# Patient Record
Sex: MALE
[De-identification: names, ages, dates, MRNs, and addresses within clinical notes are randomized per-mention and may not be internally consistent; named-entity substitution may affect disease eponyms.]

---

## 2019-07-15 NOTE — XMS REPORT
[facility]

 Created on:2019



Patient:Shane Romero

Sex:Male

:1950

External Reference #:[id number]





Demographics







 Address  [address] [address]

 

 Phone  [phone]

 

 Preferred Language  en

 

 Marital Status  Unknown

 

 Mormon Affiliation  Unknown

 

 Race  Other Race

 

 Ethnic Group  Unknown









Author







 Organization  [employer]









Care Team Providers







 Name  Role  Phone

 

 Hermilo Colton  Provider Role  Unavailable









Allergies, Adverse Reactions, Alerts







 Substance  Reaction  Event Type

 

 Watermelon  Info Not Available  Non Drug Allergy

 

 Avacado  Info Not Available  Non Drug Allergy







Problems







 Problem Type  Condition  Code  Onset Dates  Condition Status

 

 Assessment  Dizziness and giddiness  R42    Active

 

 Assessment  Unspecified urinary incontinence  R32    Active

 

 Assessment  Long term current use of insulin  Z79.4    Active

 

 Problem  H/O Bell's palsy  Z86.69    Active

 

 Assessment  Anemia, chronic disease  D63.8    Active

 

 Problem  Chronic vertigo  R42    Active

 

 Assessment  Vitamin D deficiency  E55.9    Active

 

 Problem  Unspecified urinary incontinence  R32    Active

 

 Problem  Anemia, chronic disease  D63.8    Active

 

 Problem  Type 2 diabetes mellitus without  E11.9    Active



   complications      

 

 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



   incontinence      

 

 Problem  Urge incontinence of urine  N39.41    Active

 

 Assessment  Benign essential hypertension  I10    Active

 

 Assessment  Hyperlipidemia  E78.5    Active

 

 Problem  CPAP (continuous positive airway  Z99.89    Active



   pressure) dependence      

 

 Assessment  H/O Bell's palsy  Z86.69    Active

 

 Problem  Erectile dysfunction  N52.9    Active

 

 Problem  Allergic rhinitis, seasonal  J30.2    Active

 

 Problem  Long term current use of insulin  Z79.4    Active

 

 Problem  Benign essential hypertension  I10    Active

 

 Problem  JOES (obstructive sleep apnea)  G47.33    Active

 

 Assessment  JOSE (obstructive sleep apnea)  G47.33    Active

 

 Assessment  Type 2 diabetes mellitus without  E11.9    Active



   complications      

 

 Problem  Hyperlipidemia  E78.5    Active

 

 Problem  BMI 45.0-49.9, adult  Z68.42    Active

 

 Problem  Carotid artery stenosis and  I65.29    Active



   occlusion      

 

 Problem  Vitamin D deficiency  E55.9    Active







Medications







 Medication  Code  Code  Instructions  Start  End  Status  Dosage



   System      Date  Date    

 

 Cozaar  NDC  97610187092  100 MG Orally      Inactive  1 tablet



       Once a day        

 

 Bystolic  NDC  95800809695  20 MG Orally      Active  1 tablet



       Once a day        

 

 Cozaar  NDC  01575818231  100 MG Orally      Active  1 tablet



       Once a day        

 

 CPap titration  NDC  88171215600  5 (28)-10 (21)      Active  not



       MG Orally        defined

 

 HydrALAZINE HCl  NDC  78084391458  100 MG Orally      Active  1 tablet



       Three times a        with food



       day        

 

 Bystolic  ND  80650077249  20 MG      Active  TAKE 1



               TABLET ONE



               TIME DAILY

 

 Daily Multiple  NDC  0        Active  not



 Vitamin/Iron              defined

 

 HydrALAZINE HCl  NDC  22011744261  100 MG      Active  TAKE 1



               TABLET



               THREE



               TIMES



               DAILY WITH



               FOOD

 

 Olmesartan  NDC  53130889663  20 MG Orally      Active  1 tablet



 Medoxomil      Once a day        

 

 Metformin HCl  NDC  82443783130  500 MG Orally      Active  1 tablet



       Twice a day        with meals

 

 Touabigail CatesoStar  NDC  15724564822  300 UNIT/ML      Active  55 units



       Subcutaneous 50        



       units every        



       other night        

 

 Amlodipine  NDC  97651397805  5 MG Orally      Active  1 tablet



 Besylate      Once a day        

 

 Vitamin D3  NDC  25746161090  5000 UNIT      Active  1 capsule



 Maximum      Orally Once a        



 Strength      day        

 

 Lovastatin  NDC  37009623075  20 MG Orally      Active  1 tablet



       Once a day        with a



               meal

 

 Lovastatin  NDC  71008093368  20 MG      Active  TAKE 1



               TABLET



               WITH A



               MEAL ONE



               TIME DAILY

 

 Aspirin  NDC  59444428051  81 MG Orally      Active  1 tablet



       Once a day        

 

 Flonase Allergy  NDC  93019475575  50 MCG/ACT      Active  1 spray in



 Relief      Nasally Once a        each



       day        nostril

 

 Olmesartan  NDC  43121585042  20 MG Orally  ,    Active  1 tablet



 Medoxomil      Once a day        







Results

No Known Results



Summary Purpose

[employer]ClinicalWorks Submission

## 2019-07-15 NOTE — XMS REPORT
[facility]

 Created on:October 3, 2018



Patient:Shane Romero

Sex:Male

:1950

External Reference #:[id number]





Demographics







 Address  [address] [address]

 

 Phone  [phone]

 

 Preferred Language  en

 

 Marital Status  Unknown

 

 Worship Affiliation  Unknown

 

 Race  Other Race

 

 Ethnic Group  Unknown









Author







 Organization  [employer]









Care Team Providers







 Name  Role  Phone

 

 Colton Akhtar  Provider Role  Unavailable









Allergies

No Known Allergies



Problems







 Problem Type  Condition  Code  Onset Dates  Condition Status

 

 Assessment  Dizziness and giddiness  R42    Active

 

 Assessment  Unspecified urinary incontinence  R32    Active

 

 Problem  Anemia, chronic disease  D63.8    Active

 

 Assessment  Long term current use of insulin  Z79.4    Active

 

 Problem  Allergic rhinitis, seasonal  J30.2    Active

 

 Assessment  Anemia, chronic disease  D63.8    Active

 

 Problem  Erectile dysfunction  N52.9    Active

 

 Problem  Long term current use of insulin  Z79.4    Active

 

 Problem  Benign essential hypertension  I10    Active

 

 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



   incontinence      

 

 Problem  BMI 45.0-49.9, adult  Z68.42    Active

 

 Assessment  Hyperlipidemia  E78.5    Active

 

 Assessment  H/O Bell's palsy  Z86.69    Active

 

 Problem  Urge incontinence of urine  N39.41    Active

 

 Assessment  Vitamin D deficiency  E55.9    Active

 

 Problem  Carotid artery stenosis and  I65.29    Active



   occlusion      

 

 Problem  JOSE (obstructive sleep apnea)  G47.33    Active

 

 Problem  Hyperlipidemia  E78.5    Active

 

 Problem  Vitamin D deficiency  E55.9    Active

 

 Assessment  Type 2 diabetes mellitus without  E11.9    Active



   complications      

 

 Assessment  Benign essential hypertension  I10    Active

 

 Assessment  JOSE (obstructive sleep apnea)  G47.33    Active

 

 Problem  Unspecified urinary incontinence  R32    Active

 

 Problem  Type 2 diabetes mellitus without  E11.9    Active



   complications      

 

 Problem  H/O Bell's palsy  Z86.69    Active

 

 Problem  Chronic vertigo  R42    Active







Medications







 Medication  Code  Code  Instructions  Start  End  Status  Dosage



   System      Date  Date    

 

 HydrALAZINE HCl  NDC  15793514274  100 MG Orally      Active  1 tablet



       Three times a        with food



       day        

 

 Flonase Allergy  ND  72488713088  50 MCG/ACT      Active  1 spray in



 Relief      Nasally Once a        each



       day        nostril

 

 [place] SoloStar  NDC  03277763616  300 UNIT/ML      Active  55 units



       Subcutaneous 50        



       units every        



       other night        

 

 Lovastatin  NDC  23034210582  20 MG Orally      Active  1 tablet



       Once a day        with a



               meal

 

 CPap titration  NDC  04487594759  5 (28)-10 (21)      Active  not



       MG Orally        defined

 

 Aspirin  NDC  87089232885  81 MG Orally      Active  1 tablet



       Once a day        

 

 Vitamin D3  NDC  17635078914  5000 UNIT Orally      Active  1 capsule



 Maximum      Once a day        



 Strength              

 

 Metformin HCl  NDC  96459194304  500 MG Orally      Active  1 tablet



       Twice a day        with meals

 

 Bystolic  NDC  11459046602  20 MG Orally      Active  1 tablet



       Once a day        

 

 Lovastatin  NDC  92933877144  20 MG Orally      Active  1 tablet



       Once a day        with a



               meal

 

 Daily Multiple  NDC  0        Active  not



 Vitamin/Iron              defined

 

 Bystolic  NDC  81541599043  20 MG Orally      Active  1 tablet



       Once a day        

 

 HydrALAZINE HCl  NDC  89343260738  100 MG Orally      Active  1 tablet



       Three times a        with food



       day        

 

 Cozaar  NDC  67167816168  100 MG Orally      Active  1 tablet



       Once a day        

 

 Amlodipine  NDC  70299671635  5 MG Orally Once      Active  1 tablet



 Besylate      a day        

 

 Cozaar  NDC  96575958735  100 MG Orally      Active  1 tablet



       Once a day        







Results

No Known Results



Summary Purpose

[employer]Clinical[employer] Submission

## 2019-07-15 NOTE — P.BOP
Preoperative diagnosis: Nuclear sclerotic and cortical cataract OS


Postoperative diagnosis: Same


Primary procedure: Phacoemulsification with IOL OS


Estimated blood loss: None


Anesthesia: Local (Subtenon's infusion with anesthesia for cataract surgery)


Complications: None


Implants: ZCB00 +23.5


Transferred to: Other (Day surgery)


Condition: Good

## 2019-07-15 NOTE — XMS REPORT
[facility]

 Created on:2019



Patient:Shane Romero

Sex:Male

:1950

External Reference #:[id number]





Demographics







 Address  [address]



   [place] [address]

 

 Phone  [phone]

 

 Preferred Language  en

 

 Marital Status  Unknown

 

 Catholic Affiliation  Unknown

 

 Race  Other Race

 

 Ethnic Group  Unknown









Author







 Organization  [employer]









Care Team Providers







 Name  Role  Phone

 

 Hermilo Colton  Provider Role  Unavailable









Allergies

No Known Allergies



Problems







 Problem Type  Condition  Code  Onset Dates  Condition Status

 

 Problem  Unspecified urinary incontinence  R32    Active

 

 Problem  Anemia, chronic disease  D63.8    Active

 

 Problem  Type 2 diabetes mellitus without  E11.9    Active



   complications      

 

 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



   incontinence      

 

 Problem  Urge incontinence of urine  N39.41    Active

 

 Problem  CPAP (continuous positive airway  Z99.89    Active



   pressure) dependence      

 

 Problem  Erectile dysfunction  N52.9    Active

 

 Problem  Allergic rhinitis, seasonal  J30.2    Active

 

 Problem  Long term current use of insulin  Z79.4    Active

 

 Problem  Benign essential hypertension  I10    Active

 

 Problem  JOSE (obstructive sleep apnea)  G47.33    Active

 

 Problem  Hyperlipidemia  E78.5    Active

 

 Problem  BMI 45.0-49.9, adult  Z68.42    Active

 

 Problem  Carotid artery stenosis and  I65.29    Active



   occlusion      

 

 Problem  H/O Bell's palsy  Z86.69    Active

 

 Problem  Vitamin D deficiency  E55.9    Active

 

 Problem  Chronic vertigo  R42    Active







Medications

No Known Medications



Results

No Known Results



Summary Purpose

[employer]ClinicalWorks Submission

## 2019-07-15 NOTE — XMS REPORT
[facility]

 Created on:2018



Patient:Shane Romero

Sex:Male

:1950

External Reference #:[id number]





Demographics







 Address  [address]



   [place] [address]

 

 Phone  [phone]

 

 Preferred Language  en

 

 Marital Status  Unknown

 

 Alevism Affiliation  Unknown

 

 Race  Other Race

 

 Ethnic Group  Unknown









Author







 Organization  [employer]









Care Team Providers







 Name  Role  Phone

 

 Hermilo Colton  Provider Role  Unavailable









Allergies

No Known Allergies



Problems







 Problem Type  Condition  Code  Onset Dates  Condition Status

 

 Problem  Erectile dysfunction  N52.9    Active

 

 Problem  Long term current use of insulin  Z79.4    Active

 

 Problem  Benign essential hypertension  I10    Active

 

 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



   incontinence      

 

 Problem  BMI 45.0-49.9, adult  Z68.42    Active

 

 Problem  Urge incontinence of urine  N39.41    Active

 

 Problem  Carotid artery stenosis and  I65.29    Active



   occlusion      

 

 Problem  JOSE (obstructive sleep apnea)  G47.33    Active

 

 Problem  Hyperlipidemia  E78.5    Active

 

 Problem  Vitamin D deficiency  E55.9    Active

 

 Problem  Unspecified urinary incontinence  R32    Active

 

 Problem  Type 2 diabetes mellitus without  E11.9    Active



   complications      

 

 Problem  H/O Bell's palsy  Z86.69    Active

 

 Problem  Anemia, chronic disease  D63.8    Active

 

 Problem  Chronic vertigo  R42    Active

 

 Problem  Allergic rhinitis, seasonal  J30.2    Active







Medications

No Known Medications



Results

No Known Results



Summary Purpose

[employer]Clinical[employer] Submission

## 2019-07-15 NOTE — XMS REPORT
[facility]

 Created on:May 1, 2019



Patient:Shane Romero

Sex:Male

:1950

External Reference #:[id number]





Demographics







 Address  [address] [address]

 

 Phone  [phone]

 

 Preferred Language  en

 

 Marital Status  Unknown

 

 Hindu Affiliation  Unknown

 

 Race  Other Race

 

 Ethnic Group  Unknown









Author







 Organization  [employer]









Care Team Providers







 Name  Role  Phone

 

 Hermilo Colton  Provider Role  Unavailable









Allergies, Adverse Reactions, Alerts







 Substance  Reaction  Event Type

 

 Watermelon  Info Not Available  Non Drug Allergy

 

 Avacado  Info Not Available  Non Drug Allergy







Problems







 Problem Type  Condition  Code  Onset Dates  Condition Status

 

 Assessment  Dizziness and giddiness  R42    Active

 

 Assessment  Unspecified urinary incontinence  R32    Active

 

 Assessment  Long term current use of insulin  Z79.4    Active

 

 Problem  H/O Bell's palsy  Z86.69    Active

 

 Assessment  Anemia, chronic disease  D63.8    Active

 

 Problem  Chronic vertigo  R42    Active

 

 Assessment  Vitamin D deficiency  E55.9    Active

 

 Problem  Unspecified urinary incontinence  R32    Active

 

 Problem  Anemia, chronic disease  D63.8    Active

 

 Problem  Type 2 diabetes mellitus without  E11.9    Active



   complications      

 

 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



   incontinence      

 

 Problem  Urge incontinence of urine  N39.41    Active

 

 Assessment  Benign essential hypertension  I10    Active

 

 Assessment  Hyperlipidemia  E78.5    Active

 

 Problem  CPAP (continuous positive airway  Z99.89    Active



   pressure) dependence      

 

 Assessment  H/O Bell's palsy  Z86.69    Active

 

 Problem  Erectile dysfunction  N52.9    Active

 

 Problem  Allergic rhinitis, seasonal  J30.2    Active

 

 Problem  Long term current use of insulin  Z79.4    Active

 

 Problem  Benign essential hypertension  I10    Active

 

 Problem  JOSE (obstructive sleep apnea)  G47.33    Active

 

 Assessment  JOSE (obstructive sleep apnea)  G47.33    Active

 

 Assessment  Type 2 diabetes mellitus without  E11.9    Active



   complications      

 

 Problem  Hyperlipidemia  E78.5    Active

 

 Problem  BMI 45.0-49.9, adult  Z68.42    Active

 

 Problem  Carotid artery stenosis and  I65.29    Active



   occlusion      

 

 Problem  Vitamin D deficiency  E55.9    Active







Medications







 Medication  Code  Code  Instructions  Start  End  Status  Dosage



   System      Date  Date    

 

 Lovastatin  NDC  99165133970  20 MG Orally      Active  1 tablet



       Once a day        with a



               meal

 

 Bystolic  NDC  22808134354  20 MG Orally      Active  1 tablet



       Once a day        

 

 Aspirin  NDC  61638840277  81 MG Orally      Active  1 tablet



       Once a day        

 

 Metformin HCl  NDC  93335463034  500 MG Orally      Active  1 tablet



       Twice a day        with meals

 

 HydrALAZINE HCl  NDC  53065076716  100 MG Orally      Active  1 tablet



       Three times a        with food



       day        

 

 CPap titration  NDC  13532154485  5 (28)-10 (21)      Active  not



       MG Orally        defined

 

 Lovastatin  NDC  97582704221  20 MG      Active  TAKE 1



               TABLET



               WITH A



               MEAL ONE



               TIME DAILY

 

 Olmesartan  NDC  64006136806  20 MG Orally      Active  1 tablet



 Medoxomil      Once a day        

 

 Olmesartan  NDC  09646146819  20 MG Orally      Active  1 tablet



 Medoxomil      Once a day        

 

 Daily Multiple  NDC  0        Active  not



 Vitamin/Iron              defined

 

 Flonase Allergy  NDC  47365148760  50 MCG/ACT      Active  1 spray in



 Relief      Nasally Once a        each



       day        nostril

 

 Toujeo SoloStar  NDC  61856351237  300 UNIT/ML      Active  55 units



       Subcutaneous 50        



       units every        



       other night        

 

 HydrALAZINE HCl  NDC  10650303216  100 MG      Active  TAKE 1



               TABLET



               THREE



               TIMES



               DAILY WITH



               FOOD

 

 Amlodipine  NDC  74261882742  5 MG Orally Once      Active  1 tablet



 Besylate      a day        

 

 Bystolic  NDC  08623756251  20 MG      Active  TAKE 1



               TABLET



               EVERY DAY

 

 Vitamin D3  NDC  54287639584  5000 UNIT Orally      Active  1 capsule



 Maximum      Once a day        



 Strength              







Results

No Known Results



Summary Purpose

eClinicalWorks Submission

## 2019-07-15 NOTE — XMS REPORT
[facility]

 Created on:May 6, 2019



Patient:Shane Romero

Sex:Male

:1950

External Reference #:[id number]





Demographics







 Address  [address] [address]

 

 Phone  [phone]

 

 Preferred Language  en

 

 Marital Status  Unknown

 

 Moravian Affiliation  Unknown

 

 Race  Other Race

 

 Ethnic Group  Unknown









Author







 Organization  [employer]









Care Team Providers







 Name  Role  Phone

 

 Colton Akhtar  Provider Role  Unavailable









Allergies

No Known Allergies



Problems







 Problem Type  Condition  Code  Onset Dates  Condition Status

 

 Problem  Unspecified urinary incontinence  R32    Active

 

 Problem  Anemia, chronic disease  D63.8    Active

 

 Problem  Type 2 diabetes mellitus without  E11.9    Active



   complications      

 

 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



   incontinence      

 

 Assessment  Type 2 diabetes mellitus without  E11.9    Active



   complications      

 

 Problem  Urge incontinence of urine  N39.41    Active

 

 Problem  CPAP (continuous positive airway  Z99.89    Active



   pressure) dependence      

 

 Problem  Erectile dysfunction  N52.9    Active

 

 Problem  Allergic rhinitis, seasonal  J30.2    Active

 

 Problem  Long term current use of insulin  Z79.4    Active

 

 Problem  Benign essential hypertension  I10    Active

 

 Problem  JOSE (obstructive sleep apnea)  G47.33    Active

 

 Assessment  Hyperlipidemia  E78.5    Active

 

 Assessment  Benign essential hypertension  I10    Active

 

 Problem  Hyperlipidemia  E78.5    Active

 

 Problem  BMI 45.0-49.9, adult  Z68.42    Active

 

 Problem  Carotid artery stenosis and  I65.29    Active



   occlusion      

 

 Problem  H/O Bell's palsy  Z86.69    Active

 

 Problem  Vitamin D deficiency  E55.9    Active

 

 Problem  Chronic vertigo  R42    Active







Medications







 Medication  Code  Code  Instructions  Start  End Date  Status  Dosage



   System      Date      

 

 Bystolic  NDC  32886600967  20 MG Orally      Active  1 tablet



       Once a day        

 

 Olmesartan  NDC  33214889192  20 MG Orally      Active  1 tablet



 Medoxomil      Once a day        

 

 Lovastatin  NDC  19554810182  20 MG Orally      Active  1 tablet



       Once a day        with a



               meal

 

 Metformin HCl  NDC  07680078748  500 MG Orally      Active  1 tablet



       Twice a day        with



               meals

 

 Amlodipine  NDC  62180763788  5 MG Orally Once      Active  1 tablet



 Besylate      a day        







Results

No Known Results



Summary Purpose

eClinicalWorks Submission

## 2019-07-16 NOTE — OP
Date of Procedure:  07/15/2019



Surgeon:  Yoko Marcano MD



Anesthesiologist:  Stephan Collins CRNA and Alex Banegas MD.



Preoperative Diagnosis:  Nuclear sclerotic and cortical cataract, left eye.



Operation Performed:  Phacoemulsification with intraocular lens implant, left eye.



Anesthesia:  Per cataract surgery.



Complications:  None.



Description Of Procedure:  In day surgery, the patient was prepped with Betadine and draped.  A conju
nctival incision was made in the inferior nasal quadrant with Vinod scissors.  A sub-Tenon block c
onsisting of a 1:1 mixture of 2% Xylocaine and 0.25% bupivacaine was placed through the conjunctival 
incision with a blunt cannula.  A Honan balloon was placed over the eye and the patient was transferr
ed to the operating room. 



In the operating room the patient was prepped and draped in the usual sterile fashion for ophthalmic 
surgery.  A lid speculum was placed in the left eye.  Two paracentesis sites were made superiorly and
 inferiorly in the limbal cornea.  Viscoat was placed in the anterior chamber and a crescent blade wa
s used to make a corneal groove and tunnel, and a keratome was used to enter the anterior chamber.  P
rovisc was placed in the anterior chamber and a 360 degree capsulotomy was performed with a cystitome
.  The lens was hydrodissected with BSS and rotated freely.  The lens was removed with a stop and cho
p technique.  A 5.30 phaco CDE was used to remove the lens.  Residual cortex was removed with the irr
igation and aspiration.  Provisc was placed in the capsular bag.  A ZCB00 +23.5 lens was placed in th
e capsular bag without complications.  Irrigation and aspiration were used to remove residual viscoel
astic.  The paracentesis sites were hydrated with BSS.  The wound and paracentesis sites were inspect
ed and found to be watertight.  Vigamox 0.07 cc was placed intracamerally at the end of the procedure
.  The eye was irrigated with balanced salt solution.  The eye was patched with a soft cotton patch a
nd Milner metal shield. 



The patient was returned to day surgery in good condition.



Comments:  A 1:5000 epinephrine was placed in the anterior chamber prior to Viscoat and the lens was 
hydrodelineated rather than hydrodissected.



Discharge Instructions:  Mr. Romero is discharged to home in good condition and is to follow up with Dr Radha Marcano in the morning.





[id number]/JULIET

DD:  07/15/2019 14:20:50Voice ID:  [id number]

DT:  07/16/2019 00:40:45Report ID:  [id number]

## 2020-10-21 ENCOUNTER — HOSPITAL ENCOUNTER (EMERGENCY)
Dept: HOSPITAL 97 - ER | Age: 70
LOS: 1 days | Discharge: ANOTHER HOSPITAL | End: 2020-10-22
Payer: COMMERCIAL

## 2020-10-21 DIAGNOSIS — S72.331A: Primary | ICD-10-CM

## 2020-10-21 DIAGNOSIS — Z20.828: ICD-10-CM

## 2020-10-21 DIAGNOSIS — I10: ICD-10-CM
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
## 2020-10-21 DIAGNOSIS — E11.9: ICD-10-CM
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
## 2020-10-21 PROCEDURE — 73562 X-RAY EXAM OF KNEE 3: CPT

## 2020-10-21 PROCEDURE — 99285 EMERGENCY DEPT VISIT HI MDM: CPT

## 2020-10-21 PROCEDURE — 29505 APPLICATION LONG LEG SPLINT: CPT

## 2020-10-21 NOTE — XMS REPORT
Summary of Care

                          Created on:2020



Patient:Raul Romero

Sex:Male

:1950

External Reference #:[id number]





Demographics







                          Address                   [address]



                                                    [place] [address]

 

                          Phone                     [phone]

 

                          Home Phone                [phone]

 

                          Mobile Phone              [phone]

 

                          Email Address             [email or web address]

 

                          Preferred Language        English

 

                          Marital Status            

 

                          Holiness Affiliation     Unknown

 

                          Race                      White

 

                          Ethnic Group               or 









Author







                          Organization              [facility]

 

                          Address                   [address] [place] [address]









Support







                Name            Relationship    Address         Phone

 

                Hallie Romero       Unavailable     [address]    [phone]



                                                [place] [address] 

 

                Cookie Romero    Unavailable     Unavailable     [phone]









Care Team Providers







                    Name                Role                Phone

 

                    Colton Akhtar     Primary Care Provider [phone]









Reason for Visit







                          Reason                    Comments

 

                          Follow-up                 3mo







Encounter Details







             Date         Type         Department   Care Team    Description

 

                2020      Office Visit    [facility]     Halle Menjivar M D



                                        Essential hypertension (Primary Dx);



                                                            Cardiology- [address]         Dizziness;



                                                [address] [place] [address] DRIVE



                                        Morbid obesity;



                                                            Drive, [address]



                                        [address]



                                        JOSE on CPAP



                                                [place], 

[address]



                                        



                                                            [phone] (Fax) 







Allergies

No Known Allergiesdocumented as of this encounter (statuses as of 2020)



Medications







          Medication Sig       Dispensed Refills   Start Date End Date  Status

 

          metFORMIN 500 mg 24 Take 500 mg           0                           

  Active



          hr tablet by mouth 2                                         



                    (two) times                                         



                    daily with                                         



                    meals.                                            

 

          CALCIUM   Take 2,000           0                             Active



          CARBONATE/VITAMIN D3 Int'l Units                                      

   



          (VITAMIN D-3 ORAL) by mouth                                          



                    daily.                                            

 

          aspirin 81 mg Take 81 mg           0                             Activ

e



          chewable tablet by mouth                                          



                    daily.                                            

 

          insulin glargine inject 50           0                             Act

michelle



          (TOUJEO SOLOSTAR) Units under                                         



          300 unit/mL (1.5 mL) the skin                                         

 



          InPn      every other                                         



                    day.                                              

 

          MULTIVITAMIN Take  by            0                             Active



          ORALIndications: D 3 mouth                                            

 



          125 mcg one a day daily.                                            



                    Indications                                         



                    : D 3 125                                         



                    mcg one a                                         



                    day                                               

 

          carvediloL 6.25 mg Take 6.25           0                             A

ctive



          tablet    mg by mouth                                         



                    2 (two)                                           



                    times                                             



                    daily.                                            

 

          atorvastatin 40 mg Take 1    30 tablet 2         2020           

Active



          tabletIndications: tablet by                                         



          Cerebrovascular mouth at                                          



          accident (CVA), bedtime.                                          



          unspecified                                                   



          mechanism                                                   

 

          olmesartan 40 mg Take 40 mg           0                             Ac

tive



          tablet    by mouth                                          



                    daily.                                            

 

          docusate sodium Take  by            0                             Acti

ve



          (STOOL SOFTENER mouth                                             



          ORAL)     daily.                                            

 

          hydrALAZINE 100 mg Take 1              0         2020           

Active



          tablet    tablet by                                         



                    mouth every                                         



                    8 (eight)                                         



                    hours.                                            

 

          amLODIPine 10 mg Take 1    30 tablet 3         2020           Ac

tive



          tabletIndications: tablet by                                         



          Essential mouth                                             



          hypertension daily.                                            

 

          CALCIUM CARBONATE Take 1,200           0                    D

iscontinued



          ORAL      mg by mouth                               0         (Duplica

te)



                    daily.                                            



documented as of this encounter (statuses as of 2020)



Active Problems







                          Problem                   Noted Date

 

                           E46 Unspecified severe protein-calorie malnutrition 0

2020

 

                          Stroke                    2020

 

                          Dizziness                 2020



documented as of this encounter (statuses as of 2020)



Social History







             Tobacco Use  Types        Packs/Day    Years Used   Date

 

             Never Smoker                                        









                Smokeless Tobacco: Never Used                                 









                Alcohol Use     Drinks/Week     oz/Week         Comments

 

                No                                              









                          Sex Assigned at Birth     Date Recorded

 

                          Not on file               









                    COVID-19 Exposure   Response            Date Recorded

 

                    In the last month, have you been in contact with No / Unsure

         2020  2:39 PM 

CDT



                    someone who was confirmed or suspected to have              

       



                    Coronavirus / COVID-19?                     



documented as of this encounter



Last Filed Vital Signs







                Vital Sign      Reading         Time Taken      Comments

 

                Blood Pressure  150/79          2020  2:47 PM 



                                                CDT             

 

                Pulse           66              2020  2:47 PM 



                                                CDT             

 

                Temperature     -               -               

 

                Respiratory Rate 19              2020  2:44 PM 



                                                CDT             

 

                Oxygen Saturation 94%             2020  2:44 PM 



                                                CDT             

 

                Inhaled Oxygen Concentration -               -               

 

                Weight          137.6 kg (303 lb 6.4 oz) 2020  2:44 PM 



                                                CDT             

 

                Height          167.6 cm (5' 6") 2020  2:44 PM 



                                                CDT             

 

                Body Mass Index 48.97           2020  2:44 PM 



                                                CDT             



documented in this encounter



Patient Instructions

Patient InstructionsHalle Menjivar MD - 2020  2:20 PM CDTAmlodipine, 
carvedilol, olmesartan, hydralazine--try to hold one medicine for a week at a 
time trying to find out which one is causing dizzinessElectronically signed by 
Halle Menjivar MD at 2020  3:01 PM CDT

documented in this encounter



Progress Notes

Halle Menjivar MD - 2020  2:20 PM CDTFormatting of this note might be 
different from the original.

CARDIOLOGY CLINIC NOTE

2020

Reason for Referral/Presenting Complaint: HTN, dizziness



PCP: Colton Akhtar



History of Present Illness:

Raul Romero is a 69 years old male with history of DM, HTN, HLD, obesity, JOSE on
C-PAP and moderate carotid artery disease (50%). He was here to evaluate 
dizziness and falls. We have reduced antihypertensives. His dizziness has 
continued. It occurs with any position. He has been through extensive cardiac 
evaluation including ECHO/Holter/Catotid duplex etc.

He saw Dr. Martel for a 2nd opinion.

In 2020 he was admitted to [facility] for stroke. Presented with double vision. ECHO
showed an interatrial shunt. Home BP now 140-150s. His main complaint is still 
dizziness without vertigo.



ECHO 2020

Grade II diastolic dysfunction with elevated LA pressure.

There is concentric remodeling.

Left ventricular systolic function is normal.

Ejection Fraction = 60-65%.

Grade II diastolic dysfunction with elevated LA pressure.

The left ventricular wall motion is normal.

Injection of agitated saline contrast documented a significant interatrial 
shunt.



Review of Systems:

General: (-) fever, (-) chills, (-) weight change, (+) dizziness, (-) fatigue

Skin: (-) rash

HEENT: (-) headache, (-) change in vision

Neck: (-) difficulty swallowing

Heme: negative

Resp: (-) cough, (-) dyspnea on exertion

Cardio: (-) chest pain, (-) palpitations, (-) syncope

GI: (-) vomiting, (-) diarrhea

: negative

Endo: (-) diabetes, (-) thyroid disease

Neuro: (-) numbness, (-) tingling, (-) weakness

Back: (-) pain

HANNAH: (-) muscle pain, (-) claudication

Psych: (-) anxiety, (-) depression



Past Medical History:

Past Medical History:

Diagnosis Date

 Bell palsy

 DM (diabetes mellitus)

 HTN (hypertension)



Current Medications:

Current Outpatient Medications

Medication Sig Dispense Refill

 amLODIPine 10 mg tablet Take 1 tablet by mouth daily. 30 tablet 3

 hydrALAZINE 100 mg tablet Take 1 tablet by mouth every 8 (eight) hours.

 docusate sodium (STOOL SOFTENER ORAL) Take  by mouth daily.

 olmesartan 40 mg tablet Take 40 mg by mouth daily.

 carvediloL 6.25 mg tablet Take 6.25 mg by mouth 2 (two) times daily.

 aspirin 81 mg chewable tablet Take 81 mg by mouth daily.

 CALCIUM CARBONATE/VITAMIN D3 (VITAMIN D-3 ORAL) Take 2,000 Int'l Units by 
mouth daily.

 insulin glargine (TOUJEO SOLOSTAR) 300 unit/mL (1.5 mL) InPn inject 50 Units
under the skin every other day.

 metFORMIN 500 mg 24 hr tablet Take 500 mg by mouth 2 (two) times daily with 
meals.

 MULTIVITAMIN ORAL Take  by mouth daily. Indications: D 3 125 mcg one a day

 atorvastatin 40 mg tablet Take 1 tablet by mouth at bedtime. 30 tablet 2



No current facility-administered medications for this visit.



Social History:

Social History



Socioeconomic History

 Marital status: 

  Spouse name: Not on file

 Number of children: Not on file

 Years of education: Not on file

 Highest education level: Not on file

Occupational History

 Not on file

Social Needs

 Financial resource strain: Not on file

 Food insecurity

  Worry: Not on file

  Inability: Not on file

 Transportation needs

  Medical: Not on file

  Non-medical: Not on file

Tobacco Use

 Smoking status: Never Smoker

 Smokeless tobacco: Never Used

Substance and Sexual Activity

 Alcohol use: No

 Drug use: No

 Sexual activity: Not on file

Lifestyle

 Physical activity

  Days per week: Not on file

  Minutes per session: Not on file

 Stress: Not on file

Relationships

 Social connections

  Talks on phone: Not on file

  Gets together: Not on file

  Attends [employer] service: Not on file

  Active member of club or organization: Not on file

  Attends meetings of clubs or organizations: Not on file

  Relationship status: Not on file

 Intimate partner violence

  Fear of current or ex partner: Not on file

  Emotionally abused: Not on file

  Physically abused: Not on file

  Forced sexual activity: Not on file

Other Topics Concern

 Not on file

Social History Narrative

 Not on file



Family History

Family History

Problem Relation Age of Onset

 MI (myocardial infarction) Father 60



Physical Examination:

BP (!) 150/79  | Pulse 66  | Resp 19  | Ht 5' 6" (1.676 m)  | Wt 303 lb 6.4 oz 
(137.6 kg)  | SpO2 94%  | BMI 48.97 kg/m

Constitutional: alert and oriented x 3 (person, place and date/time); no 
apparent distress, obese

ENT: normocephalic atraumatic, supple, no lymphadenopathy, no bruits, no JVD

Lungs: clear to auscultation bilaterally

Cardiovascular: S1, S2 normal, regular; no murmurs, rubs or gallops

GI: soft; non-tender; + distended; normoactive bowel sounds

: not examined

Musculoskeletal: Extremities: no clubbing, cyanosis, + trace pitting edema

Skin: no rashes

Neuro: no focal deficits



Cardiovascular testing:

EKG:

Sinus bradycardia, HR 54 bpm.



Assessment/Plan:

  ICD-10-CM ICD-9-CM

1. Essential hypertension  I10 401.9

2. Dizziness  R42 780.4

3. Morbid obesity  E66.01 278.01

4. JOSE on CPAP  G47.33 327.23

 Z99.89 V46.8



HTN--His BP is mildly elevated. However dizziness is a barrier. Unclear if BP 
medications are causing dizziness. Advised him to hold on 1 BP pill for a week 
and find out if medications are the problem.Advised to increase amlodipine to 10
mg. Continue current antihypertensives--hydralazine, olmesartan, and coreg.

HLD/Carotid disease--On ASA and statins. With DM and carotid disease, consider 
goal LDL of 70 and moderate to high intensity statins.

Stroke--on ASA/plavix. Sees Neurology in [place].

Interatrial shunt--doubt the correlation with his stroke.

Patient was counseled for lifestyle modifications including: diet, exercise and 
weight loss. RTC 1 month



Halle Menjivar MD, FACC, NALINI

, Division of Cardiology

[facility]



Electronically signed by Halle Menjivar MD at 2020  9:04 PM CDTdocumented 
in this encounter



Plan of Treatment







             Date         Type         Specialty    Care Team    Description

 

                10/28/2020      Office Visit    Cardiology      Halle Menjivar M D



                                        



                                                                [address] [address]

15



                                        



                                                                [phone] (Fax) 









                Health Maintenance Due Date        Last Done       Comments

 

                HEPATITIS C (HCV) SCREEN 1950                      

 

                DTaP,Tdap,and Td Vaccines (1 - Tdap) 10/03/1969                 

     

 

                COLON CANCER SCREENING ANNUAL FIT/FOBT 10/03/2000               

       

 

                COLON CANCER SCREENING FIT DNA EVERY 3 YEARS 10/03/2000         

             

 

                COLON CANCER SCREENING SIGMOIDOSCOPY EVERY 5 YEARS 10/03/2000   

                   

 

                COLONOSCOPY     10/03/2000                      

 

                Colorectal Cancer Screening 10/03/2000                      

 

                Zoster Recombinant Vaccine (SHINGRIX) (1 of 2) 10/03/2000       

               

 

                Medicare Wellness Visit 10/03/2015                      

 

                PNEUMOCOCCAL VACCINES 65+ (1 of 1 - PPSV23) 10/03/2015          

            

 

                INFLUENZA VACCINE (#1) 2020                      

 

                Depression Screening 2021      



documented as of this encounter



Results

Not on filedocumented in this encounter



Visit Diagnoses







                                        Diagnosis

 

                                        Essential hypertension - Primary







                                        Unspecified essential hypertension

 

                                        Dizziness







                                        Dizziness and giddiness

 

                                        Morbid obesity

 

                                        JOSE on CPAP







                                        Obstructive sleep apnea (adult) (pediatr

ic)



documented in this encounter



Insurance







          Payer     Benefit Plan / Subscriber ID Effective Phone     Address   T

ype



                    Group               Dates                         

 

          MEDICARE  MEDICARE PART A [id number] 10/1/2015-Pre [phone]  [address]

X Medicare



                      & B                   sent       [id number]       [id number]



                                        



                                                            [place]        



                                                            [id number] 

 

          [employer]INENTAL [employer]INENTAL [id number] 10/1/2015-Pre                     I

ndemnity



          LIFE      LIFE                sent                          









           Guarantor Name Account Type Relation to Date of Birth Phone      Bill

ing



                                 Patient                          Address

 

           Raul Romero Personal/Family Self       1950 [phone] [address]







                                                       (Home)     [place], [place]



                                                                  [address]



documented as of this encounter

## 2020-10-21 NOTE — XMS REPORT
[facility]

                            Created on:2020



Patient:Shane Romero

Sex:Male

:1950

External Reference #:[id number]





Demographics







                          Address                   [address] [address]

 

                          Phone                     [phone]

 

                          Preferred Language        en

 

                          Marital Status            Unknown

 

                          [employer] Affiliation     Unknown

 

                          Race                      Unknown

 

                          Ethnic Group              Unknown









Author







                          Organization              [employer]









Care Team Providers







                    Name                Role                Phone

 

                    Hermilo Colton       Provider Role       Unavailable









Allergies, Adverse Reactions, Alerts







                    Substance           Reaction            Event Type

 

                    Watermelon          Info Not Available  Non Drug Allergy

 

                    [place]             Info Not Available  Non Drug Allergy







Problems







             Problem Type Condition    Code         Onset Dates  Condition Statu

s

 

             Assessment   Long term current use of insulin Z79.4                

     Active

 

             Assessment   Unspecified urinary incontinence R32                  

     Active

 

             Assessment   Vitamin D deficiency E55.9                     Active

 

             Assessment   Anemia, chronic disease D63.8                     Acti

ve

 

             Assessment   H/O Bell's palsy Z86.69                    Active

 

             Assessment   Hyperlipidemia E78.5                     Active

 

             Assessment   Benign essential hypertension I10                     

  Active

 

             Assessment   Type 2 diabetes mellitus without E11.9                

     Active



                          complications                           

 

             Assessment   JOSE (obstructive sleep apnea) G47.33                  

  Active

 

             Problem      Type 2 diabetes mellitus without E11.9                

     Active



                          complications                           

 

             Assessment   Right upper lobe pulmonary nodule R91.1               

      Active

 

             Problem      Anemia, chronic disease D63.8                     Acti

ve

 

             Assessment   Diplopia     H53.2                     Active

 

             Problem      Allergic rhinitis, seasonal J30.2                     

Active

 

             Problem      Benign essential hypertension I10                     

  Active

 

             Problem      Erectile dysfunction N52.9                     Active

 

             Problem      Right upper lobe pulmonary nodule R91.1               

      Active

 

             Problem      Multiple lacunar infarcts I63.81                    Ac

tive

 

             Assessment   Cerebral infarction due to I63.219                   A

ctive



                          unspecified occlusion or stenosis                     

      



                          of unspecified vertebral artery                       

    

 

             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

ctive



                          unspecified occlusion or stenosis                     

      



                          of unspecified vertebral artery                       

    

 

             Assessment   Multiple lacunar infarcts I63.81                    Ac

tive

 

             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                  

  Active



                          incontinence                           

 

             Problem      Long term current use of insulin Z79.4                

     Active

 

             Problem      CPAP (continuous positive airway Z99.89               

     Active



                          pressure) dependence                           

 

             Problem      Urge incontinence of urine N39.41                    A

ctive

 

             Assessment   Right leg weakness R29.898                   Active

 

             Problem      Vitamin D deficiency E55.9                     Active

 

             Assessment   Dizziness and giddiness R42                       Acti

ve

 

             Problem      Hyperlipidemia E78.5                     Active

 

             Problem      JOSE (obstructive sleep apnea) G47.33                  

  Active

 

             Assessment   History of falling Z91.81                    Active

 

             Problem      Carotid artery stenosis and I65.29                    

Active



                          occlusion                              

 

             Problem      Chronic vertigo R42                       Active

 

             Problem      Unspecified urinary incontinence R32                  

     Active

 

             Problem      BMI 45.0-49.9, adult Z68.42                    Active

 

             Problem      H/O Bell's palsy Z86.69                    Active







Medications







        Medication Code    Code    Instructions Start   End     Status  Dosage



                System                  Date    Date            

 

        Flonase Allergy NDC     67674432234 50 MCG/ACT                 Active  1

 spray in



        Relief                  Nasally Once a                         each



                                day                             nostril

 

        Metformin HCl NDC     11407856980 500 MG Orally                 Active  

1 tablet



                                Twice a day                         with meals

 

        CPap titration NDC     23491091826 5 (28)-10 ()                 Active

  not



                                MG Orally                         defined

 

        HydrALAZINE HCl NDC     70398410662 100 MG Orally                 Active

  1 tablet



                                Three times a                         with food



                                day                             

 

        Daily Multiple NDC     0                               Active  not



        Vitamin/Iron                                                 defined

 

        Toujeo SoloStar NDC     82777429837 300 UNIT/ML                 Active  

55 units



                                Subcutaneous 50                         



                                units every                         



                                other night                         

 

        Atorvastatin NDC     47497984435 40 MG Orally                 Active  1 

tablet



        Calcium                 Once a day                         

 

        Olmesartan NDC     22087794900 40 MG Orally                 Active  1 ta

blet



        Medoxomil                 Once a day                         

 

        Carvedilol NDC     40872504847 6.25 MG Orally                 Active  1 

tablet



                                Twice a day                         with food

 

        Amlodipine NDC     48650164686 5 MG Orally                 Active  1 tab

let



        Besylate                 Once a day                         

 

        Aspirin NDC     10895077242 81 MG Orally                 Active  1 table

t



                                Once a day                         

 

        Clopidogrel NDC     55541852300 75 MG Orally                 Active  1 t

ablet



        Bisulfate                 Once a day                         

 

        Vitamin D3 NDC     41577374232 5000 UNIT                 Active  1 capsu

le



        Maximum                 Orally Once a                         



        Strength                 day                             

 

        Lovastatin NDC     98451309988 20 MG Orally                 Inactive 1 t

ablet



                                Once a day                         with a



                                                                meal







Results

No Known Results



Summary Purpose

eClinicalWorks Submission

## 2020-10-21 NOTE — XMS REPORT
Summary of Care

                           Created on:2020



Patient:Raul Romero

Sex:Male

:1950

External Reference #:[id number]





Demographics







                          Address                   [address]



                                                    [place] [address]

 

                          Phone                     [phone]

 

                          Home Phone                [phone]

 

                          Mobile Phone              [phone]

 

                          Email Address             [email or web address]

 

                          Preferred Language        English

 

                          Marital Status            

 

                          [employer] Affiliation     Unknown

 

                          Race                      White

 

                          Ethnic Group               or 









Author







                          Organization              [facility]

 

                          Address                   [address] [place] [address]









Support







                Name            Relationship    Address         Phone

 

                Hallie Romero       Unavailable     [address]    [phone]



                                                [place] [address] 

 

                Cookie Romero    Unavailable     Unavailable     [phone]









Care Team Providers







                    Name                Role                Phone

 

                    Colton Akhtar     Primary Care Provider [phone]









Reason for Visit







                          Reason                    Comments

 

                          Refill Request            







Encounter Details







             Date         Type         Department   Care Team    Description

 

                2020      Refill          [facility]- Catrachita Menjivar MD



                                        Refill Request



                                                            [address]



                                        



                                                [address] [facility], [address]



                                        



                                                            [address]



                                        [place] [address]



                                        



                                                            [place] [address]

[address]



                                        [phone] (Fax) 







Allergies

No Known Allergiesdocumented as of this encounter (statuses as of 10/02/2020)



Medications







          Medication Sig       Dispensed Refills   Start Date End Date  Status

 

          metFORMIN 500 mg 24 Take 500 mg           0                           

  Active



          hr tablet by mouth 2                                         



                    (two) times                                         



                    daily with                                         



                    meals.                                            

 

          CALCIUM   Take 2,000           0                             Active



          CARBONATE/VITAMIN D3 Int'l Units                                      

   



          (VITAMIN D-3 ORAL) by mouth                                          



                    daily.                                            

 

          aspirin 81 mg Take 81 mg           0                             Activ

e



          chewable tablet by mouth                                          



                    daily.                                            

 

          insulin glargine inject 50           0                             Act

michelle



          (TOUJEO SOLOSTAR) 300 Units under                                     

    



          unit/mL (1.5 mL) InPn the skin                                        

  



                    every other                                         



                    day.                                              

 

          MULTIVITAMIN Take  by            0                             Active



          ORALIndications: D 3 mouth daily.                                     

    



          125 mcg one a day Indications:                                        

 



                    D 3 125 mcg                                         



                    one a day                                         

 

          carvediloL 6.25 mg Take 6.25 mg           0                           

  Active



          tablet    by mouth 2                                         



                    (two) times                                         



                    daily.                                            

 

          atorvastatin 40 mg Take 1    30 tablet 2         2020           

Active



          tabletIndications: tablet by                                         



          Cerebrovascular mouth at                                          



          accident (CVA), bedtime.                                          



          unspecified mechanism                                                 

  

 

          olmesartan 40 mg Take 40 mg           0                             Ac

tive



          tablet    by mouth                                          



                    daily.                                            

 

          docusate sodium Take  by            0                             Acti

ve



          (STOOL SOFTENER ORAL) mouth daily.                                    

     

 

          hydrALAZINE 100 mg Take 1              0         2020           

Active



          tablet    tablet by                                         



                    mouth every                                         



                    8 (eight)                                         



                    hours.                                            

 

          AMLODIPINE 10 mg TAKE 1    270 tablet 1         10/02/2020           A

ctive



          tabletIndications: TABLET BY                                         



          Essential MOUTH EVERY                                         



          hypertension DAY                                               

 

          amLODIPine 10 mg Take 1    30 tablet 3         2020 10/02/202 Di

scontinued



          tabletIndications: tablet by                               0         



          Essential mouth daily.                                         



          hypertension                                                   



documented as of this encounter (statuses as of 10/02/2020)



Active Problems







                          Problem                   Noted Date

 

                           E46 Unspecified severe protein-calorie malnutrition 0

2020

 

                          Stroke                    2020

 

                          Dizziness                 2020



documented as of this encounter (statuses as of 10/02/2020)



Social History







             Tobacco Use  Types        Packs/Day    Years Used   Date

 

             Never Smoker                                        









                Smokeless Tobacco: Never Used                                 









                Alcohol Use     Drinks/Week     oz/Week         Comments

 

                No                                              









                          Sex Assigned at Birth     Date Recorded

 

                          Not on file               









                    COVID-19 Exposure   Response            Date Recorded

 

                    In the last month, have you been in contact with No / Unsure

         2020  2:39 PM 

CDT



                    someone who was confirmed or suspected to have              

       



                    Coronavirus / COVID-19?                     



documented as of this encounter



Last Filed Vital Signs

Not on filedocumented in this encounter



Plan of Treatment







             Date         Type         Specialty    Care Team    Description

 

                10/28/2020      Office Visit    Cardiology      Halle Menjivar M D



                                        



                                                                [address] [place] [address]

15



                                        



                                                                [phone] ([phone] 









                Health Maintenance Due Date        Last Done       Comments

 

                HEPATITIS C (HCV) SCREEN 1950                      

 

                DTaP,Tdap,and Td Vaccines (1 - Tdap) 10/03/1969                 

     

 

                COLON CANCER SCREENING ANNUAL FIT/FOBT 10/03/2000               

       

 

                COLON CANCER SCREENING FIT DNA EVERY 3 YEARS 10/03/2000         

             

 

                COLON CANCER SCREENING SIGMOIDOSCOPY EVERY 5 YEARS 10/03/2000   

                   

 

                COLONOSCOPY     10/03/2000                      

 

                Colorectal Cancer Screening 10/03/2000                      

 

                Zoster Recombinant Vaccine (SHINGRIX) (1 of 2) 10/03/2000       

               

 

                Medicare Wellness Visit 10/03/2015                      

 

                PNEUMOCOCCAL VACCINES 65+ (1 of 1 - PPSV23) 10/03/2015          

            

 

                INFLUENZA VACCINE (#1) 2020                      

 

                Depression Screening 2021      



documented as of this encounter



Results

Not on filedocumented in this encounter



Visit Diagnoses







                                        Diagnosis

 

                                        Essential hypertension







                                        Unspecified essential hypertension



documented in this encounter



Insurance







          Payer     Benefit Plan / Subscriber ID Effective Phone     Address   T

[facility]



                    Group               Dates                         

 

          MEDICARE  MEDICARE PART A [id number] 10/1/2015-Pre [phone]  [address]

X Medicare



                      & B                   sent       [id number]       [id number]



                                        



                                                            [place]        



                                                            [phone] 

 

          [facility] [id number] 10/1/2015-Pre                     I

ndemnity



          LIFE      LIFE                sent                          



documented as of this encounter

## 2020-10-21 NOTE — XMS REPORT
Summary of Care

                          Created on:2020



Patient:Raul Romero

Sex:Male

:1950

External Reference #:[id number]





Demographics







                          Address                   [address]



                                                    [place] [address]

 

                          Phone                     [phone]

 

                          Home Phone                [phone]

 

                          Mobile Phone              [phone]

 

                          Email Address             [email or web address]

 

                          Preferred Language        English

 

                          Marital Status            

 

                          Episcopal Affiliation     Unknown

 

                          Race                      White

 

                          Ethnic Group               or 









Author







                          Organization              [facility]

 

                          Address                   [address] [place] [id number]









Support







                Name            Relationship    Address         Phone

 

                Hallie Romero       Unavailable     [address]    [phone]



                                                [place] [address] 

 

                Cookie Romero    Unavailable     Unavailable     [phone]









Care Team Providers







                    Name                Role                Phone

 

                    Colton Akhtar     Primary Care Provider [phone]









Reason for Visit







                          Reason                    Comments

 

                          Follow-up                 3mo







Encounter Details







             Date         Type         Department   Care Team    Description

 

                2020      Office Visit    [facility]     Halle Menjivar M D



                                        Essential hypertension (Primary Dx);



                                                            Cardiology- [address]         Dizziness;



                                                [address] [place] [address] DRIVE



                                        Morbid obesity;



                                                            Drive, [address]



                                        [address]



                                        JOSE on CPAP



                                                [place], 

15



                                        



                                                            [id number] [phone] (Fax) 







Allergies

No Known Allergiesdocumented as of this encounter (statuses as of 2020)



Medications







          Medication Sig       Dispensed Refills   Start Date End Date  Status

 

          metFORMIN 500 mg 24 Take 500 mg           0                           

  Active



          hr tablet by mouth 2                                         



                    (two) times                                         



                    daily with                                         



                    meals.                                            

 

          CALCIUM   Take 2,000           0                             Active



          CARBONATE/VITAMIN D3 Int'l Units                                      

   



          (VITAMIN D-3 ORAL) by mouth                                          



                    daily.                                            

 

          aspirin 81 mg Take 81 mg           0                             Activ

e



          chewable tablet by mouth                                          



                    daily.                                            

 

          insulin glargine inject 50           0                             Act

michelle



          (TOUJEO SOLOSTAR) Units under                                         



          300 unit/mL (1.5 mL) the skin                                         

 



          InPn      every other                                         



                    day.                                              

 

          MULTIVITAMIN Take  by            0                             Active



          ORALIndications: D 3 mouth                                            

 



          125 mcg one a day daily.                                            



                    Indications                                         



                    : D 3 125                                         



                    mcg one a                                         



                    day                                               

 

          carvediloL 6.25 mg Take 6.25           0                             A

ctive



          tablet    mg by mouth                                         



                    2 (two)                                           



                    times                                             



                    daily.                                            

 

          atorvastatin 40 mg Take 1    30 tablet 2         2020           

Active



          tabletIndications: tablet by                                         



          Cerebrovascular mouth at                                          



          accident (CVA), bedtime.                                          



          unspecified                                                   



          mechanism                                                   

 

          olmesartan 40 mg Take 40 mg           0                             Ac

tive



          tablet    by mouth                                          



                    daily.                                            

 

          docusate sodium Take  by            0                             Acti

ve



          (STOOL SOFTENER mouth                                             



          ORAL)     daily.                                            

 

          hydrALAZINE 100 mg Take 1              0         2020           

Active



          tablet    tablet by                                         



                    mouth every                                         



                    8 (eight)                                         



                    hours.                                            

 

          amLODIPine 10 mg Take 1    30 tablet 3         2020           Ac

tive



          tabletIndications: tablet by                                         



          Essential mouth                                             



          hypertension daily.                                            

 

          CALCIUM CARBONATE Take 1,200           0                    D

iscontinued



          ORAL      mg by mouth                               0         (Duplica

te)



                    daily.                                            



documented as of this encounter (statuses as of 2020)



Active Problems







                          Problem                   Noted Date

 

                           E46 Unspecified severe protein-calorie malnutrition 0

2020

 

                          Stroke                    2020

 

                          Dizziness                 2020



documented as of this encounter (statuses as of 2020)



Social History







             Tobacco Use  Types        Packs/Day    Years Used   Date

 

             Never Smoker                                        









                Smokeless Tobacco: Never Used                                 









                Alcohol Use     Drinks/Week     oz/Week         Comments

 

                No                                              









                          Sex Assigned at Birth     Date Recorded

 

                          Not on file               









                    COVID-19 Exposure   Response            Date Recorded

 

                    In the last month, have you been in contact with No / Unsure

         2020  2:39 PM 

CDT



                    someone who was confirmed or suspected to have              

       



                    Coronavirus / COVID-19?                     



documented as of this encounter



Last Filed Vital Signs







                Vital Sign      Reading         Time Taken      Comments

 

                Blood Pressure  150/79          2020  2:47 PM 



                                                CDT             

 

                Pulse           66              2020  2:47 PM 



                                                CDT             

 

                Temperature     -               -               

 

                Respiratory Rate 19              2020  2:44 PM 



                                                CDT             

 

                Oxygen Saturation 94%             2020  2:44 PM 



                                                CDT             

 

                Inhaled Oxygen Concentration -               -               

 

                Weight          137.6 kg (303 lb 6.4 oz) 2020  2:44 PM 



                                                CDT             

 

                Height          167.6 cm (5' 6") 2020  2:44 PM 



                                                CDT             

 

                Body Mass Index 48.97           2020  2:44 PM 



                                                CDT             



documented in this encounter



Patient Instructions

Patient InstructionsHalle Menjivar MD - 2020  2:20 PM CDTAmlodipine, 
carvedilol, olmesartan, hydralazine--try to hold one medicine for a week at a 
time trying to find out which one is causing dizzinessElectronically signed by 
Halle Menjivar MD at 2020  3:01 PM CDT

documented in this encounter



Progress Notes

Halle Menjivar MD - 2020  2:20 PM CDTFormatting of this note might be 
different from the original.

CARDIOLOGY CLINIC NOTE

2020

Reason for Referral/Presenting Complaint: HTN, dizziness



PCP: Colton Akhtar



History of Present Illness:

Raul Romero is a 69 years old male with history of DM, HTN, HLD, obesity, JOSE on
C-PAP and moderate carotid artery disease (50%). He was here to evaluate 
dizziness and falls. We have reduced antihypertensives. His dizziness has 
continued. It occurs with any position. He has been through extensive cardiac 
evaluation including ECHO/Holter/Catotid duplex etc.

He saw Dr. Martel for a 2nd opinion.

In 2020 he was admitted to [facility] for stroke. Presented with double vision. ECHO
showed an interatrial shunt. Home BP now 140-150s. His main complaint is still 
dizziness without vertigo.



ECHO 2020

Grade II diastolic dysfunction with elevated LA pressure.

There is concentric remodeling.

Left ventricular systolic function is normal.

Ejection Fraction = 60-65%.

Grade II diastolic dysfunction with elevated LA pressure.

The left ventricular wall motion is normal.

Injection of agitated saline contrast documented a significant interatrial 
shunt.



Review of Systems:

General: (-) fever, (-) chills, (-) weight change, (+) dizziness, (-) fatigue

Skin: (-) rash

HEENT: (-) headache, (-) change in vision

Neck: (-) difficulty swallowing

Heme: negative

Resp: (-) cough, (-) dyspnea on exertion

Cardio: (-) chest pain, (-) palpitations, (-) syncope

GI: (-) vomiting, (-) diarrhea

: negative

Endo: (-) diabetes, (-) thyroid disease

Neuro: (-) numbness, (-) tingling, (-) weakness

Back: (-) pain

HANNAH: (-) muscle pain, (-) claudication

Psych: (-) anxiety, (-) depression



Past Medical History:

Past Medical History:

Diagnosis Date

 Bell palsy

 DM (diabetes mellitus)

 HTN (hypertension)



Current Medications:

Current Outpatient Medications

Medication Sig Dispense Refill

 amLODIPine 10 mg tablet Take 1 tablet by mouth daily. 30 tablet 3

 hydrALAZINE 100 mg tablet Take 1 tablet by mouth every 8 (eight) hours.

 docusate sodium (STOOL SOFTENER ORAL) Take  by mouth daily.

 olmesartan 40 mg tablet Take 40 mg by mouth daily.

 carvediloL 6.25 mg tablet Take 6.25 mg by mouth 2 (two) times daily.

 aspirin 81 mg chewable tablet Take 81 mg by mouth daily.

 CALCIUM CARBONATE/VITAMIN D3 (VITAMIN D-3 ORAL) Take 2,000 Int'l Units by 
mouth daily.

 insulin glargine (TOUJEO SOLOSTAR) 300 unit/mL (1.5 mL) InPn inject 50 Units
under the skin every other day.

 metFORMIN 500 mg 24 hr tablet Take 500 mg by mouth 2 (two) times daily with 
meals.

 MULTIVITAMIN ORAL Take  by mouth daily. Indications: D 3 125 mcg one a day

 atorvastatin 40 mg tablet Take 1 tablet by mouth at bedtime. 30 tablet 2



No current facility-administered medications for this visit.



Social History:

Social History



Socioeconomic History

 Marital status: 

  Spouse name: Not on file

 Number of children: Not on file

 Years of education: Not on file

 Highest education level: Not on file

Occupational History

 Not on file

Social Needs

 Financial resource strain: Not on file

 Food insecurity

  Worry: Not on file

  Inability: Not on file

 Transportation needs

  Medical: Not on file

  Non-medical: Not on file

Tobacco Use

 Smoking status: Never Smoker

 Smokeless tobacco: Never Used

Substance and Sexual Activity

 Alcohol use: No

 Drug use: No

 Sexual activity: Not on file

Lifestyle

 Physical activity

  Days per week: Not on file

  Minutes per session: Not on file

 Stress: Not on file

Relationships

 Social connections

  Talks on phone: Not on file

  Gets together: Not on file

  Attends Sabianist service: Not on file

  Active member of club or organization: Not on file

  Attends meetings of clubs or organizations: Not on file

  Relationship status: Not on file

 Intimate partner violence

  Fear of current or ex partner: Not on file

  Emotionally abused: Not on file

  Physically abused: Not on file

  Forced sexual activity: Not on file

Other Topics Concern

 Not on file

Social History Narrative

 Not on file



Family History

Family History

Problem Relation Age of Onset

 MI (myocardial infarction) Father 60



Physical Examination:

BP (!) 150/79  | Pulse 66  | Resp 19  | Ht 5' 6" (1.676 m)  | Wt 303 lb 6.4 oz 
(137.6 kg)  | SpO2 94%  | BMI 48.97 kg/m

Constitutional: alert and oriented x 3 (person, place and date/time); no 
apparent distress, obese

ENT: normocephalic atraumatic, supple, no lymphadenopathy, no bruits, no JVD

Lungs: clear to auscultation bilaterally

Cardiovascular: S1, S2 normal, regular; no murmurs, rubs or gallops

GI: soft; non-tender; + distended; normoactive bowel sounds

: not examined

Musculoskeletal: Extremities: no clubbing, cyanosis, + trace pitting edema

Skin: no rashes

Neuro: no focal deficits



Cardiovascular testing:

EKG:

Sinus bradycardia, HR 54 bpm.



Assessment/Plan:

  ICD-10-CM ICD-9-CM

1. Essential hypertension  I10 401.9

2. Dizziness  R42 780.4

3. Morbid obesity  E66.01 278.01

4. JOSE on CPAP  G47.33 327.23

 Z99.89 V46.8



HTN--His BP is mildly elevated. However dizziness is a barrier. Unclear if BP 
medications are causing dizziness. Advised him to hold on 1 BP pill for a week 
and find out if medications are the problem.Advised to increase amlodipine to 10
mg. Continue current antihypertensives--hydralazine, olmesartan, and coreg.

HLD/Carotid disease--On ASA and statins. With DM and carotid disease, consider 
goal LDL of 70 and moderate to high intensity statins.

Stroke--on ASA/plavix. Sees Neurology in [place].

Interatrial shunt--doubt the correlation with his stroke.

Patient was counseled for lifestyle modifications including: diet, exercise and 
weight loss. RTC 1 month



Halle Menjivar MD, FACC, NALINI

, Division of Cardiology

[facility]



Electronically signed by Halle Menjivar MD at 2020  9:04 PM CDTdocumented 
in this encounter



Plan of Treatment







             Date         Type         Specialty    Care Team    Description

 

                10/28/2020      Office Visit    Cardiology      Halle Menjivar M D



                                        



                                                                [address] [address]

15



                                        



                                                                [phone] (Fax) 









                Health Maintenance Due Date        Last Done       Comments

 

                HEPATITIS C (HCV) SCREEN 1950                      

 

                DTaP,Tdap,and Td Vaccines (1 - Tdap) 10/03/1969                 

     

 

                COLON CANCER SCREENING ANNUAL FIT/FOBT 10/03/2000               

       

 

                COLON CANCER SCREENING FIT DNA EVERY 3 YEARS 10/03/2000         

             

 

                COLON CANCER SCREENING SIGMOIDOSCOPY EVERY 5 YEARS 10/03/2000   

                   

 

                COLONOSCOPY     10/03/2000                      

 

                Colorectal Cancer Screening 10/03/2000                      

 

                Zoster Recombinant Vaccine (SHINGRIX) (1 of 2) 10/03/2000       

               

 

                Medicare Wellness Visit 10/03/2015                      

 

                PNEUMOCOCCAL VACCINES 65+ (1 of 1 - PPSV23) 10/03/2015          

            

 

                INFLUENZA VACCINE (#1) 2020                      

 

                Depression Screening 2021      



documented as of this encounter



Results

Not on filedocumented in this encounter



Visit Diagnoses







                                        Diagnosis

 

                                        Essential hypertension - Primary







                                        Unspecified essential hypertension

 

                                        Dizziness







                                        Dizziness and giddiness

 

                                        Morbid obesity

 

                                        JOSE on CPAP







                                        Obstructive sleep apnea (adult) (pediatr

ic)



documented in this encounter



Insurance







          Payer     Benefit Plan / Subscriber ID Effective Phone     Address   T

ype



                    Group               Dates                         

 

          MEDICARE  MEDICARE PART A [id number] 10/1/2015-Pre [phone]  [address]

X Medicare



                      & B                   sent       [id number]       [id number]



                                        



                                                            [place]        



                                                            [address] 

 

          [employer]INENTAL [id number] 10/1/2015-Pre                     I

ndemnity



          LIFE      LIFE                sent                          









           Guarantor Name Account Type Relation to Date of Birth Phone      Bill

ing



                                 Patient                          Address

 

           Raul Romero Personal/Family Self       1950 [phone] [address]







                                                       (Home)     [place], [place]



                                                                  [address]



documented as of this encounter

## 2020-10-21 NOTE — XMS REPORT
[facility]

                           Created on:2020



Patient:Shane Romero

Sex:Male

:1950

External Reference #:[id number]





Demographics







                          Address                   [address]



                                                    [place] [address]

 

                          Phone                     [phone]

 

                          Preferred Language        en

 

                          Marital Status            Unknown

 

                          Mormonism Affiliation     Unknown

 

                          Race                      Unknown

 

                          Ethnic Group              Unknown









Author







                          Organization              [employer]









Care Team Providers







                    Name                Role                Phone

 

                    Colton Akhtar       Provider Role       Unavailable









Allergies

No Known Allergies



Problems







             Problem Type Condition    Code         Onset Dates  Condition Statu

s

 

             Problem      Allergic rhinitis, seasonal J30.2                     

Active

 

             Problem      Benign essential hypertension I10                     

  Active

 

             Problem      Erectile dysfunction N52.9                     Active

 

             Problem      Right upper lobe pulmonary nodule R91.1               

      Active

 

             Problem      Multiple lacunar infarcts I63.81                    Ac

tive

 

             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

ctive



                          unspecified occlusion or stenosis                     

      



                          of unspecified vertebral artery                       

    

 

             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                  

  Active



                          incontinence                           

 

             Problem      Long term current use of insulin Z79.4                

     Active

 

             Problem      CPAP (continuous positive airway Z99.89               

     Active



                          pressure) dependence                           

 

             Problem      Urge incontinence of urine N39.41                    A

ctive

 

             Problem      Vitamin D deficiency E55.9                     Active

 

             Problem      Hyperlipidemia E78.5                     Active

 

             Problem      JOSE (obstructive sleep apnea) G47.33                  

  Active

 

             Problem      Carotid artery stenosis and I65.29                    

Active



                          occlusion                              

 

             Problem      Chronic vertigo R42                       Active

 

             Problem      Unspecified urinary incontinence R32                  

     Active

 

             Problem      BMI 45.0-49.9, adult Z68.42                    Active

 

             Problem      Type 2 diabetes mellitus without E11.9                

     Active



                          complications                           

 

             Problem      H/O Bell's palsy Z86.69                    Active

 

             Problem      Anemia, chronic disease D63.8                     Acti

ve







Medications

No Known Medications



Results

No Known Results



Summary Purpose

[employer] Submission

## 2020-10-21 NOTE — XMS REPORT
[facility]

                           Created on:August 10, 2020



Patient:Shane Romero

Sex:Male

:1950

External Reference #:[id number]





Demographics







                          Address                   [address]



                                                    [place] [address]

 

                          Phone                     [phone]

 

                          Preferred Language        en

 

                          Marital Status            Unknown

 

                          [employer] Affiliation     Unknown

 

                          Race                      Unknown

 

                          Ethnic Group              Unknown









Author







                          Organization              [employer]









Care Team Providers







                    Name                Role                Phone

 

                    Colton Akhtar       Provider Role       Unavailable









Allergies

No Known Allergies



Problems







             Problem Type Condition    Code         Onset Dates  Condition Statu

s

 

             Problem      Allergic rhinitis, seasonal J30.2                     

Active

 

             Problem      Benign essential hypertension I10                     

  Active

 

             Problem      Erectile dysfunction N52.9                     Active

 

             Problem      Right upper lobe pulmonary nodule R91.1               

      Active

 

             Problem      Multiple lacunar infarcts I63.81                    Ac

tive

 

             Assessment   Adrenal mass 1 cm to 4 cm in E27.8                    

 Active



                          diameter                               

 

             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

ctive



                          unspecified occlusion or stenosis                     

      



                          of unspecified vertebral artery                       

    

 

             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                  

  Active



                          incontinence                           

 

             Problem      Long term current use of insulin Z79.4                

     Active

 

             Problem      CPAP (continuous positive airway Z99.89               

     Active



                          pressure) dependence                           

 

             Problem      Urge incontinence of urine N39.41                    A

ctive

 

             Problem      Vitamin D deficiency E55.9                     Active

 

             Problem      Hyperlipidemia E78.5                     Active

 

             Problem      JOSE (obstructive sleep apnea) G47.33                  

  Active

 

             Problem      Carotid artery stenosis and I65.29                    

Active



                          occlusion                              

 

             Problem      Chronic vertigo R42                       Active

 

             Problem      Unspecified urinary incontinence R32                  

     Active

 

             Problem      BMI 45.0-49.9, adult Z68.42                    Active

 

             Problem      Type 2 diabetes mellitus without E11.9                

     Active



                          complications                           

 

             Problem      H/O Bell's palsy Z86.69                    Active

 

             Problem      Anemia, chronic disease D63.8                     Acti

ve







Medications

No Known Medications



Results

No Known Results



Summary Purpose

[employer]Clinical[employer] Submission

## 2020-10-21 NOTE — XMS REPORT
[facility]

                           Created on:2020



Patient:Shane Romero

Sex:Male

:1950

External Reference #:[id number]





Demographics







                          Address                   [address]



                                                    [place] [address]

 

                          Phone                     [phone]

 

                          Preferred Language        en

 

                          Marital Status            Unknown

 

                          Judaism Affiliation     Unknown

 

                          Race                      Unknown

 

                          Ethnic Group              Unknown









Author







                          Organization              [employer]









Care Team Providers







                    Name                Role                Phone

 

                    Colton Akhtar       Provider Role       Unavailable









Allergies

No Known Allergies



Problems







             Problem Type Condition    Code         Onset Dates  Condition Statu

s

 

             Problem      Allergic rhinitis, seasonal J30.2                     

Active

 

             Problem      Benign essential hypertension I10                     

  Active

 

             Problem      Erectile dysfunction N52.9                     Active

 

             Problem      Right upper lobe pulmonary nodule R91.1               

      Active

 

             Problem      Multiple lacunar infarcts I63.81                    Ac

tive

 

             Assessment   Type 2 diabetes mellitus without E11.9                

     Active



                          complications                           

 

             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

ctive



                          unspecified occlusion or stenosis                     

      



                          of unspecified vertebral artery                       

    

 

             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                  

  Active



                          incontinence                           

 

             Problem      Long term current use of insulin Z79.4                

     Active

 

             Problem      CPAP (continuous positive airway Z99.89               

     Active



                          pressure) dependence                           

 

             Problem      Urge incontinence of urine N39.41                    A

ctive

 

             Problem      Vitamin D deficiency E55.9                     Active

 

             Problem      Hyperlipidemia E78.5                     Active

 

             Problem      JOSE (obstructive sleep apnea) G47.33                  

  Active

 

             Problem      Carotid artery stenosis and I65.29                    

Active



                          occlusion                              

 

             Problem      Chronic vertigo R42                       Active

 

             Problem      Unspecified urinary incontinence R32                  

     Active

 

             Problem      BMI 45.0-49.9, adult Z68.42                    Active

 

             Problem      Type 2 diabetes mellitus without E11.9                

     Active



                          complications                           

 

             Problem      H/O Bell's palsy Z86.69                    Active

 

             Problem      Anemia, chronic disease D63.8                     Acti

ve







Medications

No Known Medications



Results

No Known Results



Summary Purpose

[employer]ClinicalWorks Submission

## 2020-10-21 NOTE — XMS REPORT
[facility]

                            Created on:2020



Patient:Shane Romero

Sex:Male

:1950

External Reference #:[id number]





Demographics







                          Address                   [address]



                                                    [place] [address]

 

                          Phone                     [phone]

 

                          Preferred Language        en

 

                          Marital Status            Unknown

 

                          Hindu Affiliation     Unknown

 

                          Race                      Unknown

 

                          Ethnic Group              Unknown









Author







                          Organization              [employer]









Care Team Providers







                    Name                Role                Phone

 

                    Hermilo Colton       Provider Role       Unavailable









Allergies

No Known Allergies



Problems







             Problem Type Condition    Code         Onset Dates  Condition Statu

s

 

             Problem      Allergic rhinitis, seasonal J30.2                     

Active

 

             Problem      Benign essential hypertension I10                     

  Active

 

             Problem      Erectile dysfunction N52.9                     Active

 

             Problem      Right upper lobe pulmonary nodule R91.1               

      Active

 

             Problem      Multiple lacunar infarcts I63.81                    Ac

tive

 

             Assessment   Right upper lobe pulmonary nodule R91.1               

      Active

 

             Assessment   Incidental lung nodule R91.1                     Activ

e

 

             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

ctive



                          unspecified occlusion or stenosis                     

      



                          of unspecified vertebral artery                       

    

 

             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                  

  Active



                          incontinence                           

 

             Problem      Long term current use of insulin Z79.4                

     Active

 

             Problem      CPAP (continuous positive airway Z99.89               

     Active



                          pressure) dependence                           

 

             Problem      Urge incontinence of urine N39.41                    A

ctive

 

             Problem      Vitamin D deficiency E55.9                     Active

 

             Problem      Hyperlipidemia E78.5                     Active

 

             Problem      JOSE (obstructive sleep apnea) G47.33                  

  Active

 

             Problem      Carotid artery stenosis and I65.29                    

Active



                          occlusion                              

 

             Problem      Chronic vertigo R42                       Active

 

             Problem      Unspecified urinary incontinence R32                  

     Active

 

             Problem      BMI 45.0-49.9, adult Z68.42                    Active

 

             Problem      Type 2 diabetes mellitus without E11.9                

     Active



                          complications                           

 

             Problem      H/O Bell's palsy Z86.69                    Active

 

             Problem      Anemia, chronic disease D63.8                     Acti

ve







Medications

No Known Medications



Results

No Known Results



Summary Purpose

eClinicalWorks Submission

## 2020-10-21 NOTE — XMS REPORT
Continuity of Care Document

                           Created on:2020



Patient:DEBBIE STANLEY

Sex:Male

:1950

External Reference #:[id number]





Demographics







                          Address                   [address]



                                                    [address]



                                                    [place] [address]

 

                          Home Phone                [phone]

 

                          Work Phone                [phone]

 

                          Mobile Phone              [phone]

 

                          Email Address             NONE

 

                          Preferred Language        en

 

                          Marital Status            Unknown

 

                          Worship Affiliation     Unknown

 

                          Race                      Unknown

 

                          Additional Race(s)        Unavailable



                                                    Unavailable



                                                    White

 

                          Ethnic Group              Unknown









Author







                          Organization              [facility]

t

 

                          Address                   [address]



                                                    [place] [address]

 

                          Phone                     [phone]









Support







                Name            Relationship    Address         Phone

 

                Hallie Stanley      Spouse          [address]    [phone]



                                                [place] [address] 

 

                Cookie Stanley   Child           Unavailable     [phone]









Care Team Providers







                    Name                Role                Phone

 

                    Otto GU, Halle    Attending Clinician [phone]









Problems







       Condition Condition Condition Status Onset  Resolution Last   Treating Co

mments 

Source



       Name   Details Category        Date   Date   Treatment Clinician        



                                                 Date                 

 

       Chronic Chronic Problem Active                                    [facility]



       vertigo vertigo                                                  [facility]



                                                                      [facility]



                                                                      [facility]

 

       Unspecifie Unspecifie Problem Active                                    [facility]

[facility]



       d urinary d urinary                                                  [facility]

s -



       incontinen incontinen                                                  Me

moria



       ce     ce                                                      [facility]

 

       Anemia, Anemia, Problem Active                                    [facility]



       chronic chronic                                                  [facility]



       disease disease                                                  [facility]



                                                                      [facility]

 

       Long term Long term Problem Active                                    [facility]



       current current                                                  [facility]



       use of use of                                                  [facility]



       insulin insulin                                                  l



                                                                      [facility]

 

       Allergic Allergic Problem Active                                    [facility]

t



       rhinitis, rhinitis,                                                  Luke

s -



       seasonal seasonal                                                  [facility]

a



                                                                      [facility]

 

       Erectile Erectile Problem Active                                    [facility]

t



       dysfunctio dysfunctio                                                  [facility]el

kes -



       n      n                                                       [facility]



                                                                      [facility]

 

       Benign Benign Problem Active                                    [facility]



       essential essential                                                  Luke

s -



       hypertensi hypertensi                                                  Me

moria



       on     on                                                      [facility]

 

       Mixed  Mixed  Problem Active                                    [facility]



       stress and stress and                                                  [facility]

kes -



       urge   urge                                                    [facility]



       urinary urinary                                                  l



       incontinen incontinen                                                  Ou

tpati



       ce     ce                                                      ent



                                                                      Clinics

 

       BMI    BMI    Problem Active                                    [facility]



       45.0-49.9, 45.0-49.9,                                                  [facility]el

kes -



       adult  adult                                                   [facility]



                                                                      [facility]

 

       Hyperlipid Hyperlipid Problem Active                                    [facility]

[facility]



       emia   emia                                                    [facility]



                                                                      [facility]



                                                                      l



                                                                      [facility]



                                                                      ent



                                                                      Clinics

 

       H/O Bell's H/O Bell's Problem Active                                    [facility]

[facility]



       palsy  palsy                                                   [facility]



                                                                      [facility]



                                                                      [facility]

 

       Urge   Urge   Problem Active                                    [facility]



       incontinen incontinen                                                  [facility]

kes -



       [facility]



       urine  urine                                                   l



                                                                      [facility]



                                                                      ent



                                                                      Clinics

 

       Vitamin D Vitamin D Problem Active                                    [facility]



       deficiency deficiency                                                  [facility]el

kes -



                                                                      [facility]



                                                                      l



                                                                      [facility]



                                                                      ent



                                                                      Clinics

 

       Carotid Carotid Problem Active                                    [facility]



       artery artery                                                  [facility]



       stenosis stenosis                                                  [facility]

a



       and    and                                                     l



       occlusion occlusion                                                  Outp

ati



                                                                      ent



                                                                      Clinics

 

       JOSE    JOSE    Problem Active                                    [facility]



       (obstructi (obstructi                                                  Chantel

kes -



       ve sleep ve sleep                                                  [facility]

a



       apnea) apnea)                                                  l



                                                                      [facility]

 

       Type 2 Type 2 Problem Active                                    [facility]



       diabetes diabetes                                                  [facility] 

-



       mellitus mellitus                                                  [facility]

a



       without without                                                  l



       complicati complicati                                                  Ou

tpati



       ons    ons                                                     ent



                                                                      Clinics

 

       CPAP   CPAP   Problem Active                                    [facility]



       (continuou (continuou                                                  [facility]antel

kes -



       s positive s positive                                                  Me

moria



       airway airway                                                  l



       pressure) pressure)                                                  Outp

ati



       dependence dependence                                                  en

t



                                                                      Clinics

 

       Right  Right  Problem Active                                    [facility]



       upper lobe upper lobe                                                  Chantel

kes -



       pulmonary pulmonary                                                  Fadi

fabian



       nodule nodule                                                  l



                                                                      [facility]



                                                                      ent



                                                                      Clinics

 

       Multiple Multiple Problem Active                                    [facility]

t



       lacunar lacunar                                                  [facility]



       infarcts infarcts                                                  [facility]

a



                                                                      l



                                                                      [facility]



                                                                      ent



                                                                      Clinics

 

       Cerebral Cerebral Problem Active                                    [facility]

t



       infarction infarction                                                  Chantel

kes -



       due to due to                                                  [facility]



       unspecifie unspecifie                                                  l



       d      d                                                       Outpati



       occlusion occlusion                                                  ent



       or     or                                                      Clinics



       stenosis stenosis                                                  



       of     of                                                      



       unspecifie unspecifie                                                  



       d      d                                                       



       vertebral vertebral                                                  



       artery artery                                                  







Allergies, Adverse Reactions, Alerts







       Allergy Allergy Status Severity Reaction(s) Onset  Inactive Treating Comm

ents 

Source



       Name   Type                        Date   Date   Clinician        

 

       Fela Adverse Active        Info Not                             [facility]

t



       on     Reaction               Available                             [facility]

 -



                                                                      [facility]



                                                                      l



                                                                      [facility]



                                                                      ent



                                                                      Clinics

 

       Avacado Adverse Active        Info Not                             [facility]



              Reaction               Available                             [facility]

 -



                                                                      [facility]



                                                                      l



                                                                      [facility]



                                                                      ent



                                                                      Clinics







Medications







       Ordered Filled Start  Stop   Current Ordering Indication Dosage Frequency

 Signature

                    Comments            Components          Source



     Medication Medication Date Date Medication? Clinician                (SIG) 

          



     Name Name                                                   

 

     Carvedilol Carvedilol 0      Yes  Colton                1 tablet      

     [facility]



               3-18           Akhtar                with food           [facility]



               00:00:                                              [facility]



               00                                                l



                                                                 [facility]



                                                                 ent



                                                                 Clinics

 

     Olmesartan Olmesartan           Yes  Colton                1 tablet        

   [facility]



     Medoxomil Medoxomil                Akhtar                               Luke

s -



                                                                 [facility]



                                                                 l



                                                                 [facility]



                                                                 ent



                                                                 Clinics

 

     Clopidogrel Clopidogrel           Yes  Colton                1 tablet      

     [facility]



     Bisulfate Bisulfate                Akhtar                               Luke

s -



                                                                 [facility]



                                                                 l



                                                                 [facility]



                                                                 ent



                                                                 [facility]

 

     Atorvastati Atorvastati           Yes  Colton                1 tablet      

     [facility]



     n Calcium n Calcium                Akhtar                               Luke

s -



                                                                 [facility]



                                                                 l



                                                                 [facility]



                                                                 ent



                                                                 [facility]

 

     Lovastatin Lovastatin           Yes  Colton                1 tablet        

   [facility]



                              Akhtar                with a           [facility]



                                                  meal           [facility]



                                                                 l



                                                                 [facility]

 

     Flonase Flonase           Yes  [place]ton                1 spray in           C

[facility]



     Allergy Allergy                Akhtar                each           [facility]



     Relief Relief                                    nostril           [facility]



                                                                 l



                                                                 [facility]



                                                                 ent



                                                                 [facility]

 

     HydrALAZINE HydrALAZINE           Yes  Colton                1 tablet      

     [facility]



     HCl  HCl                 Akhtar                with food           [facility]



                                                                 [facility]



                                                                 l



                                                                 [facility]



                                                                 ent



                                                                 [facility]

 

     Daily Daily           Yes  Colton                not            [facility]



     Multiple Multiple                Akhtar                defined           Farida

es -



     Vitamin/Iro Vitamin/Iro                                                   M

emoria



     n    n                                                      l



                                                                 [facility]



                                                                 ent



                                                                 [facility]

 

     CPap CPap           Yes  [place]ton                not            [facility]



     titration titration                Akhtar                defined           L

ukes -



                                                                 [facility]



                                                                 l



                                                                 [facility]



                                                                 ent



                                                                 [facility]

 

     Metformin Metformin           Yes  [place]ton                1 tablet          

 [facility]



     HCl  HCl                 Akhtar                with meals           [facility]



                                                                 [facility]



                                                                 l



                                                                 [facility]



                                                                 ent



                                                                 [facility]

 

     Aspirin Aspirin           Yes  Cloton                1 tablet           [facility]



                              Akhtar                               [facility]



                                                                 [facility]



                                                                 l



                                                                 [facility]



                                                                 ent



                                                                 [facility]

 

     Toujeo Toujeo           Yes  [facility]                55 units           [facility]

t



     SoloStar SoloStar                Akhtar                               [facility] 

-



                                                                 [facility]



                                                                 l



                                                                 [facility]



                                                                 ent



                                                                 [facility]

 

     Amlodipine Amlodipine           Yes  [facility]                1 tablet        

   [facility]



     Besylate Besylate                Akhtar                               [facility] 

-



                                                                 [facility]



                                                                 l



                                                                 [facility]

 

     Vitamin D3 Vitamin D3           Yes  [facility]                1 capsule       

    [facility]



     Maximum Maximum                Akhtar                               [facility]



     Strength Strength                                                   [facility]



                                                                 l



                                                                 [facility]



                                                                 ent



                                                                 [facility]







Procedures

This patient has no known procedures.



Encounters







        Start   End     Encounter Admission Attending Care    Care    Encounter 

Source



        Date/Time Date/Time Type    Type    Clinicians Facility Department ID   

   

 

        2020 Refill          Otto    [facility]    [id number]

90 



        00:00:00 00:00:00                 Halle Reilly [id number]         



                                                [place] [id number]         



                                                Rosmery [phone]         



                                                [address]                 

 

        2020 Office          Otto    [facility]    [id number]

91 



        14:18:11 15:05:10 Visit           Halle Reilly [id number]         



                                                [place] [id number]         



                                                [facility] [phone]         



                                                [facility]             



                                                [facility]                 

 

        2020 Outpatient                 [facility]

[id number] [facility]



        07:49:00 07:49:00                         [place]   [facility]

s -



                                                [employer]   [facility]         l



                                                Medicine                 Outpati



                                                                        ent



                                                                        Clinics

 

        2020 Outpatient                 [facility]

[id number] [facility]



        16:17:00 16:17:00                         [place]   [facility]

s -



                                                [employer]   [facility]         l



                                                Medicine                 Outpati



                                                                        ent



                                                                        Clinics

 

        2020-08-10 2020-08-10 Outpatient                 [facility]

[id number] [facility]



        07:00:00 07:00:00                         t [place]   [employer]

s -



                                                Drive   [facility]         l



                                                Medicine                 Outpati



                                                                        ent



                                                                        Clinics

 

        2020 Outpatient                 [facility]

[id number] [facility]



        08:34:00 08:34:00                         t [place]   [employer]

s -



                                                [employer]   [facility]



                                                Medicine                 Outpati



                                                                        ent



                                                                        Clinics

 

        2020 Outpatient                 [facility]

[id number] [facility]



        15:00:00 15:00:00                         t [place]   [employer]

s -



                                                [employer]   [facility]         l



                                                Medicine                 Outpati



                                                                        ent



                                                                        Clinics

 

        2020 Outpatient                 [facility]

[id number] [facility]



        08:30:00 08:30:00                         t [employer]

s -



                                                [employer]   [facility]         l



                                                Medicine                 Outpati



                                                                        ent



                                                                        Clinics

 

        2020 Outpatient                 [facility]

[id number] [facility]



        12:42:00 12:42:00                         t [place]   [employer]

s -



                                                [employer]   [facility]         l



                                                Medicine                 Outpati



                                                                        ent



                                                                        Clinics

 

        2020 Outpatient                 [facility]

[id number] [facility]



        14:45:00 14:45:00                         t [facility] [employer]

s -



                                                Road    [facility]         l



                                                Medicine                 Outpati



                                                                        ent



                                                                        Clinics

 

        2020 Outpatient                 [facility]

[id number] [facility]



        16:39:00 16:39:00                         t [place]   [employer]

s -



                                                [employer]   [facility]         l



                                                Medicine                 Outpati



                                                                        ent



                                                                        Clinics

 

        2020 Outpatient                 [facility] [address]

[id number] [facility]



        10:30:00 10:30:00                         t [place]   [address]         [place]

s -



                                                Drive   [facility]  Medicine         l



                                                Medicine                 Outpati



                                                                        ent



                                                                        Clinics

 

        2020 Outpatient                 [facility] [address]

[id number] [facility]



        15:26:00 15:26:00                         t [place]   [address]         [place]

s -



                                                Drive   [facility]  Medicine         l



                                                Medicine                 Outpati



                                                                        ent



                                                                        Clinics

 

        2019 Outpatient                 [facility]

[id number] [facility]



        11:12:00 11:12:00                         t [place]   [address]         [place]

s -



                                                Drive   [facility]  Medicine         l



                                                Medicine                 Outpati



                                                                        ent



                                                                        Clinics

 

        2019 Outpatient                 [facility]

[id number] [facility]



        14:58:00 14:58:00                         t [place]   [address]         [place]

s -



                                                Drive   [facility]  Medicine         l



                                                Medicine                 Outpati



                                                                        ent



                                                                        Clinics

 

        2019 Outpatient                 [facility]

[id number] [facility]



        11:00:00 11:00:00                         t [place]   [address]         [place]

s -



                                                Drive   [facility]  Medicine         l



                                                Medicine                 Outpati



                                                                        ent



                                                                        Clinics

 

        2019 Outpatient                 [facility] [address]

[id number] [facility]



        13:21:00 13:21:00                         t [place]   [address]         [place]

s -



                                                Drive   [facility]  Medicine         l



                                                Medicine                 Outpati



                                                                        ent



                                                                        Clinics

 

        2019 Outpatient                 [facility] [address]

[id number] [facility]



        10:45:00 10:45:00                         t [place]   [address]         [place]

s -



                                                Drive   [facility]  Medicine         l



                                                Medicine                 Outpati



                                                                        ent



                                                                        Clinics

 

        2019 Outpatient                 [facility] [address]

[id number] [facility]



        13:10:00 13:10:00                         t [place]   [address]         [place]

s -



                                                Drive   [facility]  Medicine         l



                                                Medicine                 Outpati



                                                                        ent



                                                                        Clinics

 

        2019 Outpatient                 [facility] [address]

[id number] [facility]



        15:03:00 15:03:00                         t [place]   [address]         [place]

s -



                                                Drive   [facility]  Medicine         l



                                                Medicine                 Outpati



                                                                        ent



                                                                        Clinics

 

        2019 Outpatient                 [facility] [address]

[id number] [facility]



        16:15:00 16:15:00                         t [place]   [address]         [place]

s -



                                                Drive   [facility]  Medicine         l



                                                Medicine                 Outpati



                                                                        ent



                                                                        Clinics

 

        2019 Outpatient                 [facility]

[id number] [facility]



        10:30:00 10:30:00                         t [email or web address]   [facility]                 Outpati



                                                                        ent



                                                                        Clinics

 

        2018-10-02 2018-10-02 Outpatient                 [facility]

[id number] [facility]



        10:00:00 10:00:00                         t [email or web address]   [facility]                 Outpati



                                                                        ent



                                                                        Clinics

 

        2018 Outpatient                 [facility]

[id number] [facility]



        11:35:00 11:35:00                         t [email or web address]   [facility]



                                                Medicine                 Outpati



                                                                        ent



                                                                        Clinics

 

        2018 Outpatient                 [facility]

[id number] [facility]



        11:30:00 11:30:00                         t [email or web address]   [facility]



                                                Medicine                 Outpati



                                                                        ent



                                                                        Clinics







Results

This patient has no known results.

## 2020-10-22 VITALS — DIASTOLIC BLOOD PRESSURE: 62 MMHG | SYSTOLIC BLOOD PRESSURE: 106 MMHG | OXYGEN SATURATION: 98 %

## 2020-10-22 PROCEDURE — 2W3LX1Z IMMOBILIZATION OF RIGHT LOWER EXTREMITY USING SPLINT: ICD-10-PCS

## 2020-10-22 NOTE — ER
Nurse's Notes                                                                                     

 [facility]                                                                 

Name: Shane Romero                                                                                

Age: 70 yrs                                                                                       

Sex: Male                                                                                         

: 1950                                                                                   

MRN: [id number]                                                                                   

Arrival Date: 10/21/2020                                                                          

Time: 22:52                                                                                       

Account#: [id number]                                                                            

Bed 17                                                                                            

Private MD:                                                                                       

Diagnosis: Displaced oblique fracture of shaft of right femur                                     

                                                                                                  

Presentation:                                                                                     

10/21                                                                                             

23:11 Chief complaint: EMS states: pt fell and tripped and landed on rt knee, noted           ll2 

      deformation to RT knee cap. Coronavirus screen: Client denies travel out of the [place] in     

      the last 14 days. At this time, the client does not indicate any symptoms associated        

      with coronavirus-19. Ebola Screen: No symptoms or risks identified at this time.            

      Initial Sepsis Screen: Does the patient meet any 2 criteria? No. Patient's initial          

      sepsis screen is negative. Does the patient have a suspected source of infection? No.       

      Patient's initial sepsis screen is negative. Risk Assessment: Do you want to hurt           

      yourself or someone else? Patient reports no desire to harm self or others. Onset of        

      symptoms was 2020.                                                              

23:11 Acuity: NEGRITA 3                                                                           ll2 

23:11 Method Of Arrival: EMS: [facility]                                                    ll2 

                                                                                                  

Historical:                                                                                       

- Allergies:                                                                                      

23:09 No Known Allergies;                                                                     ll2 

- Home Meds:                                                                                      

23:09 amlodipine 2.5 mg tab 1 tab once daily [Active]; metformin 500 mg Oral tab 1 tab 2      ll2 

      times per day [Active]; hydralazine 100 mg Oral tab 1 tab 3 times per day [Active];         

      olmesartan oral oral [Active]; carvedilol 6.25 mg oral tab 1 tab 2 times per day            

      [Active]; atorvastatin 40 mg oral tab 1 tab once daily [Active];                            

- PMHx:                                                                                           

23:09 Hypertension; Diabetes - IDDM;                                                          ll2 

                                                                                                  

- Immunization history:: Adult Immunizations up to date.                                          

- Social history:: Smoking status: Patient/guardian denies using tobacco products.                

- Family history:: not pertinent.                                                                 

- Hospitalizations: : No recent hospitalization is reported.                                      

                                                                                                  

                                                                                                  

Screenin:10 Abuse screen: Denies threats or abuse. Nutritional screening: No deficits noted.        ll2 

      Tuberculosis screening: No symptoms or risk factors identified. Fall Risk IV access (20     

      points). Ambulatory Aid- Gait- Impaired (20 pts.). Mental Status- Oriented to own           

      ability (0 pts). Total Melendrez Fall Scale indicates High Risk Score (45 or more points).      

      Fall prevention measures have been instituted. Side Rails Up X 2 Placed Close to            

      Nursing Station Family Present and informed to notify staff if the need to leave the        

      bedside.                                                                                    

                                                                                                  

Assessment:                                                                                       

22:54 General: Appears in no apparent distress. Behavior is calm, cooperative, appropriate    ll2 

      for age. Pain: Complains of pain in medial aspect of right knee and right knee. Neuro:      

      Level of Consciousness is awake, alert, obeys commands, Oriented to person, place,          

      time, situation. Cardiovascular: Capillary refill < 3 seconds Patient's skin is warm        

      and dry. Respiratory: Airway is patent Respiratory effort is even, unlabored,               

      Respiratory pattern is regular, symmetrical. GI: No signs and/or symptoms were reported     

      involving the gastrointestinal system. : No signs and/or symptoms were reported           

      regarding the genitourinary system. EENT: No signs and/or symptoms were reported            

      regarding the EENT system. Derm: Skin is intact, is healthy with good turgor, Skin is       

      dry, Skin is pink, warm \T\ dry. Skin temperature is warm. Musculoskeletal: Circulation,    

      motion, and sensation intact. Range of motion: limited in right knee.                       

23:32 Reassessment: Patient and/or family updated on plan of care and expected duration. Pain ll2 

      level reassessed. Patient is alert, oriented x 3, equal unlabored respirations, skin        

      warm/dry/pink. pt states pain is still 12/10, ERD notified, verbal order obtained for       

      25 mg of Demerol.                                                                           

10/22                                                                                             

00:05 Reassessment: Patient and/or family updated on plan of care and expected duration. Pain ll2 

      level reassessed. Patient is alert, oriented x 3, equal unlabored respirations, skin        

      warm/dry/pink.                                                                              

02:05 Reassessment: Patient and/or family updated on plan of care and expected duration. Pain ll2 

      level reassessed. Patient is alert, oriented x 3, equal unlabored respirations, skin        

      warm/dry/pink. report given to OUSMANE Todd.                                                  

                                                                                                  

Vital Signs:                                                                                      

10/21                                                                                             

23:10  / 75; Pulse 66; Resp 16; Pulse Ox 94% on R/A;                                    ll2 

10/22                                                                                             

00:02  / 69; Pulse 72; Resp 18; Pulse Ox 97% on R/A;                                    ll2 

01:41  / 62; Pulse 72; Resp 14; Pulse Ox 98% on R/A;                                    ll2 

                                                                                                  

ED Course:                                                                                        

10/21                                                                                             

22:52 Patient arrived in ED.                                                                  rn  

22:52 Octavio Fritz MD is Attending Physician.                                                rn  

22:53 Felicia Gifford RN is Primary Nurse.                                                  ll2 

23:11 Maintain EMS IV. Dressing intact. Good blood return noted. Site clean \T\ dry. Gauge \T\    ll
2

      site: 20 G to Rt forearm.                                                                   

23:12 Triage completed.                                                                       ll2 

23:13 Patient has correct armband on for positive identification. Call light in reach. Side   ll2 

      rails up X 1. Adult w/ patient. Pulse ox on. NIBP on.                                       

23:13 Arm band placed on right wrist.                                                         ll2 

23:58 Initiated transfer at [facility] with Tito.                                           tt3 

10/22                                                                                             

00:09 XRAY Knee RIGHT 3 view In Process Unspecified.                                          EDMS

00:26 Tito called back with their ortho physician for Dr. Fritz.                            tt3 

00:52 Tito called back with Dr. Nicole to speak with Dr. Fritz regarding the pt case.     tt3 

01:05 Julisa Leon called back with administrative approval. The accepting physician is Dr. dea Nicole. The pt is going to room 1861. Face sheet faxed to [phone]. Report to be     

      called to [phone].                                                                    

02:00 Spoke with Cookie at [facility] and they will transfer pt.                       tt3 

02:14 [facility] arrived to transfer the pt.                                            tt3 

02:25 No provider procedures requiring assistance completed. Patient transferred, IV remains  ll2 

      in place.                                                                                   

                                                                                                  

Administered Medications:                                                                         

10/21                                                                                             

23:05 Drug: Demerol 25 mg Route: IVP; Site: right forearm;                                    ll2 

23:33 Follow up: Response: No adverse reaction                                                ll2 

23:06 Drug: Zofran (Ondansetron) 4 mg Route: IVP; Site: right forearm;                        ll2 

23:33 Follow up: Response: No adverse reaction                                                ll2 

23:29 Drug: Demerol 25 mg Route: IVP; Site: right forearm;                                    ll2 

23:34 Follow up: Response: No adverse reaction                                                ll2 

10/22                                                                                             

00:48 Drug: fentaNYL (PF) 25 mcg {Note: RASS 0.} Route: IVP; Site: right antecubital;         ea  

                                                                                                  

                                                                                                  

Outcome:                                                                                          

00:26 ER care complete, transfer ordered by MD.                                               rn  

02:25 Transferred by ground EMS to [facility].                             2 

02:25 Condition: stable                                                                           

02:25 Instructed on the need for transfer.                                                        

02:25 Patient left the ED.                                                                    ll2 

                                                                                                  

Signatures:                                                                                       

Dispatcher [employer]                           EDMS                                                 

Octavio Fritz MD MD rn Antunez, Elena, RN RN ea Linscombe, Lacie, RN RN   2                                                  

Deepak Pimentel                                  tt3                                                  

                                                                                                  

Corrections: (The following items were deleted from the chart)                                    

00:59 00:48 fentaNYL (PF) 25 mcg IVP in right antecubital ea                                  ea  

                                                                                                  

**************************************************************************************************

## 2020-10-22 NOTE — EDPHYS
Physician Documentation                                                                           

 [facility]                                                                 

Name: Shane Romero                                                                                

Age: 70 yrs                                                                                       

Sex: Male                                                                                         

: 1950                                                                                   

MRN: [id number]                                                                                   

Arrival Date: 10/21/2020                                                                          

Time: 22:52                                                                                       

Account#: [id number]                                                                            

Bed 17                                                                                            

Private MD:                                                                                       

ED Physician Octavio Fritz                                                                         

HPI:                                                                                              

10/21                                                                                             

23:30 This 70 yrs old  Male presents to ER via EMS with complaints of right leg pain. rn  

23:30 The patient presents with a deformity, an injury, pain. The complaints affect the right rn  

      quadriceps and right knee. Onset: The symptoms/episode began/occurred just prior to         

      arrival. Modifying factors: The symptoms are alleviated by nothing. the symptoms are        

      aggravated by movement, bending knee. Severity of symptoms: At their worst the symptoms     

      were moderate, in the emergency department the symptoms have improved. The patient has      

      experienced similar episodes in the past. Reports recurrent falls, fell again prior to      

      arrival, landed on right knee, + swelling and painful ROM, no other injury, not on          

      blood thinners. Given ketamine by EMS with improvement of pain..                            

                                                                                                  

Historical:                                                                                       

- Allergies:                                                                                      

23:09 No Known Allergies;                                                                     ll2 

- Home Meds:                                                                                      

23:09 amlodipine 2.5 mg tab 1 tab once daily [Active]; metformin 500 mg Oral tab 1 tab 2      ll2 

      times per day [Active]; hydralazine 100 mg Oral tab 1 tab 3 times per day [Active];         

      olmesartan oral oral [Active]; carvedilol 6.25 mg oral tab 1 tab 2 times per day            

      [Active]; atorvastatin 40 mg oral tab 1 tab once daily [Active];                            

- PMHx:                                                                                           

23:09 Hypertension; Diabetes - IDDM;                                                          ll2 

                                                                                                  

- Immunization history:: Adult Immunizations up to date.                                          

- Social history:: Smoking status: Patient/guardian denies using tobacco products.                

- Family history:: not pertinent.                                                                 

- Hospitalizations: : No recent hospitalization is reported.                                      

                                                                                                  

                                                                                                  

ROS:                                                                                              

23:30 Constitutional: Negative for fever, chills, and weight loss, Eyes: Negative for injury, rn  

      pain, redness, and discharge, Neck: Negative for injury, pain, and swelling,                

      Cardiovascular: Negative for chest pain, palpitations, and edema, Respiratory: Negative     

      for shortness of breath, cough, wheezing, and pleuritic chest pain, Abdomen/GI:             

      Negative for abdominal pain, nausea, vomiting, diarrhea, and constipation, Back:            

      Negative for injury and pain, MS/Extremity: + right leg injury and deformity. Skin:         

      Negative for injury, rash, and discoloration, Neuro: Negative for headache, weakness,       

      numbness, tingling, and seizure.                                                            

                                                                                                  

Exam:                                                                                             

23:30 Constitutional:  Morbidly obese male, laying on left side of body, + moderate swelling  rn  

      and pain right knee/distal thigh, DP pedis pulses equal. No cyanosis.  Head/Face:           

      Normocephalic, atraumatic. Neck:  NO midline tenderness Chest/axilla:  No rib               

      tenderness or crepitus. Cardiovascular:  Regular rate and rhythm.  No pulse deficits.       

      Respiratory:  No increased work of breathing, no retractions or nasal flaring.              

      Abdomen/GI:  soft, non-tender Back:  No spinal tenderness.  No costovertebral               

      tenderness.  Full range of motion. MS/ Extremity:  Pulses equal, no cyanosis.  +            

      tenderness and swelling right knee and distal right thigh, no open wounds Neuro:  Awake     

      and alert, GCS 15, oriented to person, place, time, and situation.                          

                                                                                                  

Vital Signs:                                                                                      

23:10  / 75; Pulse 66; Resp 16; Pulse Ox 94% on R/A;                                    ll2 

10/22                                                                                             

00:02  / 69; Pulse 72; Resp 18; Pulse Ox 97% on R/A;                                    ll2 

01:41  / 62; Pulse 72; Resp 14; Pulse Ox 98% on R/A;                                    ll2 

                                                                                                  

MDM:                                                                                              

10/21                                                                                             

22:52 Patient medically screened.                                                             rn  

10/22                                                                                             

00:23 Differential diagnosis: closed fracture. Data reviewed: vital signs, nurses notes,      rn  

      radiologic studies, plain films, and as a result, I will admit patient. Test                

      interpretation: by ED physician or midlevel provider: plain radiologic studies, Xray        

      right femur and knee show distal right femur comminuted fracture. Counseling: I had a       

      detailed discussion with the patient and/or guardian regarding: the historical points,      

      exam findings, and any diagnostic results supporting the discharge/admit diagnosis,         

      radiology results, the need to transfer to another facility, [facility] does not immediately have the required specialist. Response to treatment: the      

      patient's symptoms have mildly improved after treatment, and as a result, I will admit      

      patient. Admission orders: after a detailed discussion of the patient's condition and       

      case, the admit orders are written by me. ED course: No ortho here, + distal comminuted     

      femur fracture, isolated, will transfer to [facility] for orthopedic care. Stable for         

      transfer. Improved with pain medication, still having some pain but after last dose,        

      mild drop in O2 sat and BP. Will monitor and given more pain control when possible. .       

                                                                                                  

10/22                                                                                             

02:05 Order name: SARS-COV-2 RT PCR; Complete Time: 02:19                                     EDMS

10/21                                                                                             

22:52 Order name: XRAY Knee RIGHT 3 view                                                      rn  

10/21                                                                                             

22:53 Order name: IV Start; Complete Time: 22:54                                              rn  

10/21                                                                                             

23:30 Order name: Splint - Posterior Leg; Complete Time: 00:58                                rn  

                                                                                                  

Administered Medications:                                                                         

10/21                                                                                             

23:05 Drug: Demerol 25 mg Route: IVP; Site: right forearm;                                    ll2 

23:33 Follow up: Response: No adverse reaction                                                ll2 

23:06 Drug: Zofran (Ondansetron) 4 mg Route: IVP; Site: right forearm;                        ll2 

23:33 Follow up: Response: No adverse reaction                                                ll2 

23:29 Drug: Demerol 25 mg Route: IVP; Site: right forearm;                                    ll2 

23:34 Follow up: Response: No adverse reaction                                                ll2 

10/22                                                                                             

00:48 Drug: fentaNYL (PF) 25 mcg {Note: RASS 0.} Route: IVP; Site: right antecubital;         ea  

                                                                                                  

                                                                                                  

Disposition:                                                                                      

10/22/20 00:26 Transfer ordered to [facility]. Diagnosis is            

  Displaced oblique fracture of shaft of right femur.                                             

- Reason for transfer: Higher level of care.                                                      

- Accepting physician is Dr. Nicole.                                                            

- Condition is Stable.                                                                            

- Problem is new.                                                                                 

- Symptoms have improved.                                                                         

                                                                                                  

                                                                                                  

                                                                                                  

Signatures:                                                                                       

Dispatcher [employer]                           EDMS                                                 

Octavio Fritz MD MD rn Antunez, Elena, RN RN ea Linscombe, Lacie, RN                    RN   ll2                                                  

                                                                                                  

Corrections: (The following items were deleted from the chart)                                    

01:16 00:26 10/22/2020 00:26 Transfer ordered to [facility].       rn  

      Diagnosis is Displaced oblique fracture of shaft of right femur. Reason for transfer:       

      Higher level of care. Accepting physician is . Condition is Stable. Problem is new.      

      Symptoms have improved. rn                                                                  

02:25 01:16 10/22/2020 00:26 Transfer ordered to [facility].       ll2 

      Diagnosis is Displaced oblique fracture of shaft of right femur. Reason for transfer:       

      Higher level of care. Accepting physician is Dr. Nicole. Condition is Stable. Problem     

      is new. Symptoms have improved. rn                                                          

                                                                                                  

**************************************************************************************************

## 2020-10-22 NOTE — RAD REPORT
EXAM DESCRIPTION:  RAD - Knee Right 3 View - 10/22/2020 12:00 am

 

CLINICAL HISTORY:  Right knee pain status post injury

 

FINDINGS:  Moderately displaced fracture involves the distal femoral diaphysis extending into the met
aphysis. It equivocally extends to the articulating surface of distal lateral femoral condyle

 

Angulation is present at fracture site.

 

No dislocation